# Patient Record
Sex: FEMALE | Race: WHITE | NOT HISPANIC OR LATINO | Employment: OTHER
[De-identification: names, ages, dates, MRNs, and addresses within clinical notes are randomized per-mention and may not be internally consistent; named-entity substitution may affect disease eponyms.]

---

## 2017-10-01 ENCOUNTER — HEALTH MAINTENANCE LETTER (OUTPATIENT)
Age: 62
End: 2017-10-01

## 2023-02-07 ENCOUNTER — APPOINTMENT (OUTPATIENT)
Dept: ULTRASOUND IMAGING | Facility: CLINIC | Age: 68
DRG: 871 | End: 2023-02-07
Attending: FAMILY MEDICINE
Payer: MEDICARE

## 2023-02-07 ENCOUNTER — APPOINTMENT (OUTPATIENT)
Dept: CT IMAGING | Facility: CLINIC | Age: 68
DRG: 871 | End: 2023-02-07
Attending: FAMILY MEDICINE
Payer: MEDICARE

## 2023-02-07 ENCOUNTER — APPOINTMENT (OUTPATIENT)
Dept: GENERAL RADIOLOGY | Facility: CLINIC | Age: 68
DRG: 871 | End: 2023-02-07
Attending: FAMILY MEDICINE
Payer: MEDICARE

## 2023-02-07 ENCOUNTER — HOSPITAL ENCOUNTER (INPATIENT)
Facility: CLINIC | Age: 68
LOS: 5 days | Discharge: HOME | DRG: 871 | End: 2023-02-12
Attending: FAMILY MEDICINE | Admitting: INTERNAL MEDICINE
Payer: MEDICARE

## 2023-02-07 DIAGNOSIS — R65.21 SEPTIC SHOCK (H): ICD-10-CM

## 2023-02-07 DIAGNOSIS — R65.20 SEPSIS WITH ENCEPHALOPATHY WITHOUT SEPTIC SHOCK, DUE TO UNSPECIFIED ORGANISM (H): ICD-10-CM

## 2023-02-07 DIAGNOSIS — R79.89 LFT ELEVATION: ICD-10-CM

## 2023-02-07 DIAGNOSIS — Z11.52 ENCOUNTER FOR SCREENING LABORATORY TESTING FOR SEVERE ACUTE RESPIRATORY SYNDROME CORONAVIRUS 2 (SARS-COV-2): ICD-10-CM

## 2023-02-07 DIAGNOSIS — A41.9 SEPSIS WITH ENCEPHALOPATHY WITHOUT SEPTIC SHOCK, DUE TO UNSPECIFIED ORGANISM (H): ICD-10-CM

## 2023-02-07 DIAGNOSIS — R78.81 E COLI BACTEREMIA: ICD-10-CM

## 2023-02-07 DIAGNOSIS — G93.41 SEPSIS WITH ENCEPHALOPATHY WITHOUT SEPTIC SHOCK, DUE TO UNSPECIFIED ORGANISM (H): ICD-10-CM

## 2023-02-07 DIAGNOSIS — N19 RENAL FAILURE, UNSPECIFIED CHRONICITY: ICD-10-CM

## 2023-02-07 DIAGNOSIS — E87.6 HYPOKALEMIA: ICD-10-CM

## 2023-02-07 DIAGNOSIS — R79.89 ELEVATED TROPONIN LEVEL NOT DUE TO ACUTE CORONARY SYNDROME: ICD-10-CM

## 2023-02-07 DIAGNOSIS — N39.0 E. COLI UTI: Primary | ICD-10-CM

## 2023-02-07 DIAGNOSIS — B96.20 E COLI BACTEREMIA: ICD-10-CM

## 2023-02-07 DIAGNOSIS — A41.9 SEPTIC SHOCK (H): ICD-10-CM

## 2023-02-07 DIAGNOSIS — N10 PYELONEPHRITIS, ACUTE: ICD-10-CM

## 2023-02-07 DIAGNOSIS — B96.20 E. COLI UTI: Primary | ICD-10-CM

## 2023-02-07 LAB
ALBUMIN SERPL BCG-MCNC: 3.5 G/DL (ref 3.5–5.2)
ALBUMIN UR-MCNC: 100 MG/DL
ALP SERPL-CCNC: 156 U/L (ref 35–104)
ALT SERPL W P-5'-P-CCNC: 88 U/L (ref 10–35)
ANION GAP SERPL CALCULATED.3IONS-SCNC: 18 MMOL/L (ref 7–15)
APPEARANCE UR: ABNORMAL
APTT PPP: 30 SECONDS (ref 22–38)
AST SERPL W P-5'-P-CCNC: 63 U/L (ref 10–35)
BACTERIA #/AREA URNS HPF: ABNORMAL /HPF
BASOPHILS # BLD AUTO: 0.1 10E3/UL (ref 0–0.2)
BASOPHILS NFR BLD AUTO: 0 %
BILIRUB SERPL-MCNC: 0.7 MG/DL
BILIRUB UR QL STRIP: NEGATIVE
BUN SERPL-MCNC: 21.8 MG/DL (ref 8–23)
CALCIUM SERPL-MCNC: 9.5 MG/DL (ref 8.8–10.2)
CHLORIDE SERPL-SCNC: 91 MMOL/L (ref 98–107)
COLOR UR AUTO: YELLOW
CREAT SERPL-MCNC: 1.33 MG/DL (ref 0.51–0.95)
CRP SERPL-MCNC: 393.3 MG/L
DEPRECATED HCO3 PLAS-SCNC: 26 MMOL/L (ref 22–29)
EOSINOPHIL # BLD AUTO: 0 10E3/UL (ref 0–0.7)
EOSINOPHIL NFR BLD AUTO: 0 %
ERYTHROCYTE [DISTWIDTH] IN BLOOD BY AUTOMATED COUNT: 13.1 % (ref 10–15)
ERYTHROCYTE [SEDIMENTATION RATE] IN BLOOD BY WESTERGREN METHOD: 41 MM/HR (ref 0–30)
FLUAV RNA SPEC QL NAA+PROBE: NEGATIVE
FLUBV RNA RESP QL NAA+PROBE: NEGATIVE
GFR SERPL CREATININE-BSD FRML MDRD: 43 ML/MIN/1.73M2
GLUCOSE BLDC GLUCOMTR-MCNC: 103 MG/DL (ref 70–99)
GLUCOSE SERPL-MCNC: 99 MG/DL (ref 70–99)
GLUCOSE UR STRIP-MCNC: NEGATIVE MG/DL
HCT VFR BLD AUTO: 36.9 % (ref 35–47)
HGB BLD-MCNC: 12.3 G/DL (ref 11.7–15.7)
HGB UR QL STRIP: ABNORMAL
HOLD SPECIMEN: NORMAL
IMM GRANULOCYTES # BLD: 0.6 10E3/UL
IMM GRANULOCYTES NFR BLD: 2 %
INR PPP: 1.09 (ref 0.85–1.15)
KETONES UR STRIP-MCNC: 15 MG/DL
LACTATE SERPL-SCNC: 1.3 MMOL/L (ref 0.7–2)
LEUKOCYTE ESTERASE UR QL STRIP: ABNORMAL
LYMPHOCYTES # BLD AUTO: 1.9 10E3/UL (ref 0.8–5.3)
LYMPHOCYTES NFR BLD AUTO: 7 %
MAGNESIUM SERPL-MCNC: 1.4 MG/DL (ref 1.7–2.3)
MAGNESIUM SERPL-MCNC: 1.7 MG/DL (ref 1.7–2.3)
MCH RBC QN AUTO: 30.4 PG (ref 26.5–33)
MCHC RBC AUTO-ENTMCNC: 33.3 G/DL (ref 31.5–36.5)
MCV RBC AUTO: 91 FL (ref 78–100)
MONOCYTES # BLD AUTO: 3.1 10E3/UL (ref 0–1.3)
MONOCYTES NFR BLD AUTO: 11 %
MUCOUS THREADS #/AREA URNS LPF: PRESENT /LPF
NEUTROPHILS # BLD AUTO: 22.3 10E3/UL (ref 1.6–8.3)
NEUTROPHILS NFR BLD AUTO: 80 %
NITRATE UR QL: POSITIVE
NRBC # BLD AUTO: 0 10E3/UL
NRBC BLD AUTO-RTO: 0 /100
PH UR STRIP: 6.5 [PH] (ref 5–7)
PLATELET # BLD AUTO: 208 10E3/UL (ref 150–450)
POTASSIUM SERPL-SCNC: 2.8 MMOL/L (ref 3.4–5.3)
POTASSIUM SERPL-SCNC: 2.9 MMOL/L (ref 3.4–5.3)
PROT SERPL-MCNC: 7.1 G/DL (ref 6.4–8.3)
RBC # BLD AUTO: 4.04 10E6/UL (ref 3.8–5.2)
RBC URINE: 12 /HPF
RSV RNA SPEC NAA+PROBE: NEGATIVE
SARS-COV-2 RNA RESP QL NAA+PROBE: NEGATIVE
SODIUM SERPL-SCNC: 135 MMOL/L (ref 136–145)
SP GR UR STRIP: 1.01 (ref 1–1.03)
SQUAMOUS EPITHELIAL: 3 /HPF
TROPONIN T SERPL HS-MCNC: 36 NG/L
TROPONIN T SERPL HS-MCNC: 44 NG/L
UROBILINOGEN UR STRIP-MCNC: NORMAL MG/DL
WBC # BLD AUTO: 28 10E3/UL (ref 4–11)
WBC CLUMPS #/AREA URNS HPF: PRESENT /HPF
WBC URINE: >182 /HPF

## 2023-02-07 PROCEDURE — 81001 URINALYSIS AUTO W/SCOPE: CPT | Performed by: FAMILY MEDICINE

## 2023-02-07 PROCEDURE — 96365 THER/PROPH/DIAG IV INF INIT: CPT | Mod: 59 | Performed by: FAMILY MEDICINE

## 2023-02-07 PROCEDURE — 71045 X-RAY EXAM CHEST 1 VIEW: CPT

## 2023-02-07 PROCEDURE — 258N000003 HC RX IP 258 OP 636: Performed by: FAMILY MEDICINE

## 2023-02-07 PROCEDURE — 87077 CULTURE AEROBIC IDENTIFY: CPT | Performed by: FAMILY MEDICINE

## 2023-02-07 PROCEDURE — 93005 ELECTROCARDIOGRAM TRACING: CPT | Performed by: FAMILY MEDICINE

## 2023-02-07 PROCEDURE — 85025 COMPLETE CBC W/AUTO DIFF WBC: CPT | Performed by: FAMILY MEDICINE

## 2023-02-07 PROCEDURE — G1010 CDSM STANSON: HCPCS

## 2023-02-07 PROCEDURE — 120N000001 HC R&B MED SURG/OB

## 2023-02-07 PROCEDURE — 250N000011 HC RX IP 250 OP 636: Performed by: FAMILY MEDICINE

## 2023-02-07 PROCEDURE — 84132 ASSAY OF SERUM POTASSIUM: CPT | Performed by: INTERNAL MEDICINE

## 2023-02-07 PROCEDURE — 85652 RBC SED RATE AUTOMATED: CPT | Performed by: FAMILY MEDICINE

## 2023-02-07 PROCEDURE — 250N000009 HC RX 250: Performed by: FAMILY MEDICINE

## 2023-02-07 PROCEDURE — 84132 ASSAY OF SERUM POTASSIUM: CPT | Performed by: FAMILY MEDICINE

## 2023-02-07 PROCEDURE — 87149 DNA/RNA DIRECT PROBE: CPT | Performed by: FAMILY MEDICINE

## 2023-02-07 PROCEDURE — 36415 COLL VENOUS BLD VENIPUNCTURE: CPT | Performed by: INTERNAL MEDICINE

## 2023-02-07 PROCEDURE — 36415 COLL VENOUS BLD VENIPUNCTURE: CPT | Performed by: FAMILY MEDICINE

## 2023-02-07 PROCEDURE — 250N000013 HC RX MED GY IP 250 OP 250 PS 637: Performed by: FAMILY MEDICINE

## 2023-02-07 PROCEDURE — 87637 SARSCOV2&INF A&B&RSV AMP PRB: CPT | Performed by: FAMILY MEDICINE

## 2023-02-07 PROCEDURE — 99285 EMERGENCY DEPT VISIT HI MDM: CPT | Mod: CS,25 | Performed by: FAMILY MEDICINE

## 2023-02-07 PROCEDURE — 83735 ASSAY OF MAGNESIUM: CPT | Performed by: FAMILY MEDICINE

## 2023-02-07 PROCEDURE — 85610 PROTHROMBIN TIME: CPT | Performed by: FAMILY MEDICINE

## 2023-02-07 PROCEDURE — 70498 CT ANGIOGRAPHY NECK: CPT | Mod: MG

## 2023-02-07 PROCEDURE — 80053 COMPREHEN METABOLIC PANEL: CPT | Performed by: FAMILY MEDICINE

## 2023-02-07 PROCEDURE — 96367 TX/PROPH/DG ADDL SEQ IV INF: CPT | Performed by: FAMILY MEDICINE

## 2023-02-07 PROCEDURE — 83605 ASSAY OF LACTIC ACID: CPT | Performed by: FAMILY MEDICINE

## 2023-02-07 PROCEDURE — 96361 HYDRATE IV INFUSION ADD-ON: CPT | Performed by: FAMILY MEDICINE

## 2023-02-07 PROCEDURE — 96375 TX/PRO/DX INJ NEW DRUG ADDON: CPT | Performed by: FAMILY MEDICINE

## 2023-02-07 PROCEDURE — C9803 HOPD COVID-19 SPEC COLLECT: HCPCS | Performed by: FAMILY MEDICINE

## 2023-02-07 PROCEDURE — 86140 C-REACTIVE PROTEIN: CPT | Performed by: FAMILY MEDICINE

## 2023-02-07 PROCEDURE — 258N000003 HC RX IP 258 OP 636: Performed by: INTERNAL MEDICINE

## 2023-02-07 PROCEDURE — 85730 THROMBOPLASTIN TIME PARTIAL: CPT | Performed by: FAMILY MEDICINE

## 2023-02-07 PROCEDURE — 76705 ECHO EXAM OF ABDOMEN: CPT

## 2023-02-07 PROCEDURE — 84484 ASSAY OF TROPONIN QUANT: CPT | Performed by: FAMILY MEDICINE

## 2023-02-07 PROCEDURE — 87186 SC STD MICRODIL/AGAR DIL: CPT | Performed by: FAMILY MEDICINE

## 2023-02-07 PROCEDURE — 99285 EMERGENCY DEPT VISIT HI MDM: CPT | Mod: CS | Performed by: FAMILY MEDICINE

## 2023-02-07 PROCEDURE — 83735 ASSAY OF MAGNESIUM: CPT | Performed by: INTERNAL MEDICINE

## 2023-02-07 PROCEDURE — 84484 ASSAY OF TROPONIN QUANT: CPT | Performed by: INTERNAL MEDICINE

## 2023-02-07 PROCEDURE — 82962 GLUCOSE BLOOD TEST: CPT

## 2023-02-07 PROCEDURE — 99223 1ST HOSP IP/OBS HIGH 75: CPT | Mod: AI | Performed by: INTERNAL MEDICINE

## 2023-02-07 PROCEDURE — 93010 ELECTROCARDIOGRAM REPORT: CPT | Performed by: FAMILY MEDICINE

## 2023-02-07 RX ORDER — LIDOCAINE 40 MG/G
CREAM TOPICAL
Status: DISCONTINUED | OUTPATIENT
Start: 2023-02-07 | End: 2023-02-12

## 2023-02-07 RX ORDER — IOPAMIDOL 755 MG/ML
500 INJECTION, SOLUTION INTRAVASCULAR ONCE
Status: COMPLETED | OUTPATIENT
Start: 2023-02-07 | End: 2023-02-07

## 2023-02-07 RX ORDER — SODIUM CHLORIDE, SODIUM LACTATE, POTASSIUM CHLORIDE, CALCIUM CHLORIDE 600; 310; 30; 20 MG/100ML; MG/100ML; MG/100ML; MG/100ML
INJECTION, SOLUTION INTRAVENOUS CONTINUOUS
Status: DISCONTINUED | OUTPATIENT
Start: 2023-02-07 | End: 2023-02-08

## 2023-02-07 RX ORDER — METRONIDAZOLE 500 MG/100ML
500 INJECTION, SOLUTION INTRAVENOUS ONCE
Status: COMPLETED | OUTPATIENT
Start: 2023-02-07 | End: 2023-02-07

## 2023-02-07 RX ORDER — LEVOTHYROXINE SODIUM 75 UG/1
75 TABLET ORAL DAILY
Status: DISCONTINUED | OUTPATIENT
Start: 2023-02-08 | End: 2023-02-12 | Stop reason: HOSPADM

## 2023-02-07 RX ORDER — LEVOTHYROXINE SODIUM 75 UG/1
75 TABLET ORAL DAILY
COMMUNITY
Start: 2022-11-30

## 2023-02-07 RX ORDER — ASPIRIN 325 MG
325 TABLET ORAL DAILY
COMMUNITY

## 2023-02-07 RX ORDER — NAPHAZOLINE HCL 0.012 %
2 DROPS OPHTHALMIC (EYE) EVERY 4 HOURS PRN
COMMUNITY

## 2023-02-07 RX ORDER — ENOXAPARIN SODIUM 100 MG/ML
40 INJECTION SUBCUTANEOUS EVERY 24 HOURS
Status: DISCONTINUED | OUTPATIENT
Start: 2023-02-08 | End: 2023-02-12 | Stop reason: HOSPADM

## 2023-02-07 RX ORDER — ATORVASTATIN CALCIUM 10 MG/1
20 TABLET, FILM COATED ORAL DAILY
Status: DISCONTINUED | OUTPATIENT
Start: 2023-02-08 | End: 2023-02-12 | Stop reason: HOSPADM

## 2023-02-07 RX ORDER — ACETAMINOPHEN 500 MG
1000 TABLET ORAL ONCE
Status: COMPLETED | OUTPATIENT
Start: 2023-02-07 | End: 2023-02-07

## 2023-02-07 RX ORDER — ONDANSETRON 2 MG/ML
4 INJECTION INTRAMUSCULAR; INTRAVENOUS EVERY 30 MIN PRN
Status: DISCONTINUED | OUTPATIENT
Start: 2023-02-07 | End: 2023-02-08

## 2023-02-07 RX ORDER — POTASSIUM CHLORIDE 1.5 G/1.58G
40 POWDER, FOR SOLUTION ORAL ONCE
Status: COMPLETED | OUTPATIENT
Start: 2023-02-07 | End: 2023-02-07

## 2023-02-07 RX ORDER — CEFTRIAXONE 1 G/1
1 INJECTION, POWDER, FOR SOLUTION INTRAMUSCULAR; INTRAVENOUS EVERY 24 HOURS
Status: DISCONTINUED | OUTPATIENT
Start: 2023-02-08 | End: 2023-02-08

## 2023-02-07 RX ORDER — ATORVASTATIN CALCIUM 20 MG/1
20 TABLET, FILM COATED ORAL DAILY
COMMUNITY
Start: 2023-01-20

## 2023-02-07 RX ADMIN — ONDANSETRON 4 MG: 2 INJECTION INTRAMUSCULAR; INTRAVENOUS at 19:05

## 2023-02-07 RX ADMIN — ACETAMINOPHEN 1000 MG: 500 TABLET ORAL at 19:32

## 2023-02-07 RX ADMIN — CEFEPIME HYDROCHLORIDE 2 G: 2 INJECTION, POWDER, FOR SOLUTION INTRAVENOUS at 19:19

## 2023-02-07 RX ADMIN — SODIUM CHLORIDE, POTASSIUM CHLORIDE, SODIUM LACTATE AND CALCIUM CHLORIDE: 600; 310; 30; 20 INJECTION, SOLUTION INTRAVENOUS at 23:31

## 2023-02-07 RX ADMIN — SODIUM CHLORIDE 100 ML: 9 INJECTION, SOLUTION INTRAVENOUS at 17:54

## 2023-02-07 RX ADMIN — SODIUM CHLORIDE, POTASSIUM CHLORIDE, SODIUM LACTATE AND CALCIUM CHLORIDE 500 ML: 600; 310; 30; 20 INJECTION, SOLUTION INTRAVENOUS at 23:49

## 2023-02-07 RX ADMIN — POTASSIUM CHLORIDE 40 MEQ: 1.5 POWDER, FOR SOLUTION ORAL at 19:06

## 2023-02-07 RX ADMIN — IOPAMIDOL 80 ML: 755 INJECTION, SOLUTION INTRAVENOUS at 17:55

## 2023-02-07 RX ADMIN — METRONIDAZOLE 500 MG: 500 INJECTION, SOLUTION INTRAVENOUS at 19:20

## 2023-02-07 RX ADMIN — SODIUM CHLORIDE 1000 ML: 9 INJECTION, SOLUTION INTRAVENOUS at 18:22

## 2023-02-07 ASSESSMENT — ACTIVITIES OF DAILY LIVING (ADL)
CONCENTRATING,_REMEMBERING_OR_MAKING_DECISIONS_DIFFICULTY: NO
WALKING_OR_CLIMBING_STAIRS_DIFFICULTY: NO
FALL_HISTORY_WITHIN_LAST_SIX_MONTHS: NO
DRESSING/BATHING_DIFFICULTY: NO
DOING_ERRANDS_INDEPENDENTLY_DIFFICULTY: NO
CHANGE_IN_FUNCTIONAL_STATUS_SINCE_ONSET_OF_CURRENT_ILLNESS/INJURY: NO
ADLS_ACUITY_SCORE: 35
DIFFICULTY_COMMUNICATING: NO
ADLS_ACUITY_SCORE: 35
WEAR_GLASSES_OR_BLIND: YES
ADLS_ACUITY_SCORE: 35
TOILETING_ISSUES: NO
HEARING_DIFFICULTY_OR_DEAF: NO
DIFFICULTY_EATING/SWALLOWING: NO

## 2023-02-07 NOTE — ED PROVIDER NOTES
"  History     Chief Complaint   Patient presents with     Nausea, Vomiting, & Diarrhea     Chills     Dizziness     Confusion     HPI  Isabell Bolden is a 67 year old female who presents to the ED this afternoon with nausea vomiting diarrhea word finding difficulties and confusion.  Accompanied by her daughter who contributes to the history.  She did not feel well yesterday and was nauseous and had emesis x1.  Developed diarrhea today.  Noted to be somewhat confused around 2:00 this afternoon by a neighbor and was having some difficulty finding words later this afternoon.  Denies any focal weakness but seem to be a bit slow to respond.  She felt \"drunk and dizzy\" yesterday.  No fevers but has had some chills and sweats.  Just has not been herself and for some reason had her cutting board on the coffee table today and she has no idea why she put that there.  Denies any headache.  No chest pain shortness of breath or abdominal pain.  Has not had anything to eat today and what she ate yesterday she vomited up.    Code stroke was called by nursing staff in triage.    Hysterectomy back in 2011 for endometrial cancer was caught early.  She had a few spots on her liver and biopsies were benign.        Allergies:  Allergies   Allergen Reactions     Ampicillin [Ampicillin Sodium]      Latex      Penicillin G Hives     Sulfa Drugs        Problem List:    Patient Active Problem List    Diagnosis Date Noted     Pyelonephritis, acute 02/07/2023     Priority: Medium     Elevated troponin level not due to acute coronary syndrome 02/07/2023     Priority: Medium     Sepsis with encephalopathy without septic shock, due to unspecified organism (H) 02/07/2023     Priority: Medium     Endometrial cancer (H) 04/02/2011     Priority: Medium        Past Medical History:    Past Medical History:   Diagnosis Date     Hypertension      Malignant neoplasm (H)        Past Surgical History:    Past Surgical History:   Procedure Laterality " Date     GYN SURGERY  2011    Uterine cancer       Family History:    Family History   Problem Relation Age of Onset     C.A.D. Father      Cancer Father      Hypertension Brother      Hypertension Sister        Social History:  Marital Status:   [5]  Social History     Tobacco Use     Smoking status: Former     Types: Cigarettes     Quit date: 2010     Years since quittin.1   Substance Use Topics     Alcohol use: Yes     Comment: Social Drinker     Drug use: No        Medications:    aspirin (ASA) 325 MG tablet  aspirin-acetaminophen-caffeine (EXCEDRIN EXTRA STRENGTH) 250-250-65 MG tablet  atorvastatin (LIPITOR) 20 MG tablet  hydrochlorothiazide (MICROZIDE) 12.5 MG capsule  levothyroxine (SYNTHROID/LEVOTHROID) 75 MCG tablet  sodium chloride (OCEAN) 0.65 % nasal spray          Review of Systems   All other systems reviewed and are negative.      Physical Exam   BP: 123/54  Pulse: 108  Temp: 98.4  F (36.9  C)  Resp: 16  Weight: 80.4 kg (177 lb 4.8 oz)  SpO2: 99 %      Physical Exam  Constitutional:       General: She is not in acute distress.     Appearance: Normal appearance.   HENT:      Mouth/Throat:      Mouth: Mucous membranes are moist.      Pharynx: Oropharynx is clear.   Eyes:      Extraocular Movements: Extraocular movements intact.      Pupils: Pupils are equal, round, and reactive to light.   Cardiovascular:      Rate and Rhythm: Normal rate and regular rhythm.   Pulmonary:      Effort: Pulmonary effort is normal.      Breath sounds: Normal breath sounds.   Abdominal:      Palpations: Abdomen is soft.      Tenderness: There is no abdominal tenderness.   Musculoskeletal:         General: Normal range of motion.      Right lower leg: No edema.      Left lower leg: No edema.   Skin:     General: Skin is warm and dry.      Findings: No rash.   Neurological:      Mental Status: She is alert and oriented to person, place, and time.      GCS: GCS eye subscore is 4. GCS verbal subscore is 5.  GCS motor subscore is 6.      Cranial Nerves: Cranial nerves 2-12 are intact.      Sensory: Sensation is intact.      Motor: Motor function is intact.      Coordination: Coordination is intact. Finger-Nose-Finger Test normal.      Gait: Gait is intact.         ED Course                 Procedures              EKG Interpretation:      Interpreted by Alin Jacobs MD  Time reviewed: 6:43 PM   Symptoms at time of EKG: weakness   Rhythm: normal sinus   Rate: 90  Axis: Normal  Ectopy: none  Conduction: normal  ST Segments/ T Waves: Non-specific ST-T wave changes  Q Waves: none  Comparison to prior: No old EKG available    Clinical Impression: Sinus rhythm at 91 bpm.  Nonspecific ST T/T wave changes are subtle.  No acute ischemic changes noted.      The patient has stroke symptoms:         ED Stroke specific documentation           NIHSS PDF     Patient last known well time: unknown, before 1400  ED Provider first to bedside at: 1739  CT Results received at: 1812    Thrombolytics:   Not given due to:   - stroke mimic: sepsis    If treating with thrombolytics: Ensure SBP<180 and DBP<105 prior to treatment with thrombolytics.  Administering thrombolytics after treatment with IV labetalol, hydralazine, or nicardipine is reasonable once BP control is established.    Endovascular Retrieval:  Not initiated due to absence of proximal vessel occlusion    National Institutes of Health Stroke Scale (Baseline)  Time Performed: 1740     Score    Level of consciousness: (0)   Alert, keenly responsive    LOC questions: (0)   Answers both questions correctly    LOC commands: (0)   Performs both tasks correctly    Best gaze: (0)   Normal    Visual: (0)   No visual loss    Facial palsy: (0)   Normal symmetrical movements    Motor arm (left): (0)   No drift    Motor arm (right): (0)   No drift    Motor leg (left): (0)   No drift    Motor leg (right): (0)   No drift    Limb ataxia: (0)   Absent    Sensory: (0)   Normal- no sensory  loss    Best language: (0)   Normal- no aphasia    Dysarthria: (0)   Normal    Extinction and inattention: (0)   No abnormality        Total Score:  0        Stroke Mimics were considered (including migraine headache, seizure disorder, hypoglycemia (or hyperglycemia), head or spinal trauma, CNS infection, Toxin ingestion and shock state (e.g. sepsis) .    Sxs deemed secondary to urosepsis, not a stroke.              Critical Care time:  was 90 minutes for this patient excluding procedures.     The patient has signs of Severe Sepsis        If one the following conditions is present, a 30 mL/kg bolus is recommended as part of the 6 hour bundle (IBW can be used for BMI >30, or document refusal/contraindication):      1.   Initial hypotension  defined as 2 bps < 90 or map < 65 in the 6hrs before or 3hrs after time zero.     2.  Lactate >4.      The patient has signs of Severe Sepsis as evidenced by:    1. 2 SIRS criteria, AND  2. Suspected infection, AND   3. Organ dysfunction: Acute encephalopathy due to sepsis (mild confusion)     Time severe sepsis diagnosis confirmed: 19:51  02/07/23 as this was the time when Met SIRS criteria and UA showed + infection    3 Hour Severe Sepsis Bundle Completion:    1. Initial Lactic Acid Result:   Recent Labs   Lab Test 02/07/23  1753   LACT 1.3     2. Blood Cultures before Antibiotics: Yes  3. Broad Spectrum Antibiotics Administered:  yes       Anti-infectives (From admission through now)    Start     Dose/Rate Route Frequency Ordered Stop    02/07/23 1840  ceFEPIme (MAXIPIME) 2 g vial to attach to  ml bag for ADULTS or 50 ml bag for PEDS         2 g  over 30 Minutes Intravenous ONCE 02/07/23 1839 02/07/23 2000 02/07/23 1840  metroNIDAZOLE (FLAGYL) infusion 500 mg        Note to Pharmacy: Metronidazole IV may be dosed more frequently than Q12h for bacteremia, CNS infection and Clostridium difficile.    500 mg  over 60 Minutes Intravenous ONCE 02/07/23 1839 02/07/23 2120           4. Is initial hypotension present?     No (IV fluid bolus NOT required). IV Fluid volume administered: 1000 ml to start.                    Severe Sepsis reassessment:  1. Repeat Lactic Acid Level within 6 hours of time zero: initial lactic acid was normal.                Results for orders placed or performed during the hospital encounter of 02/07/23 (from the past 24 hour(s))   Glucose by meter   Result Value Ref Range    GLUCOSE BY METER POCT 103 (H) 70 - 99 mg/dL   CT Head w/o Contrast    Narrative    EXAM: CT HEAD W/O CONTRAST, CTA HEAD NECK W CONTRAST  LOCATION: [facility]  DATE/TIME: 2/7/2023 5:52 PM    INDICATION: word finding difficulty, confusion  COMPARISON: None.  CONTRAST: 80mL, Isovue 370  TECHNIQUE: Head and neck CT angiogram with IV contrast. Noncontrast head CT followed by axial helical CT images of the head and neck vessels obtained during the arterial phase of intravenous contrast administration. Axial 2D reconstructed images and   multiplanar 3D MIP reconstructed images of the head and neck vessels were performed by the technologist. Dose reduction techniques were used. All stenosis measurements made according to NASCET criteria unless otherwise specified.    FINDINGS:   NONCONTRAST HEAD CT:   INTRACRANIAL CONTENTS: No intracranial hemorrhage, extraaxial collection, or mass effect.  No CT evidence of acute infarct. Normal parenchymal attenuation. Normal ventricles and sulci.     VISUALIZED ORBITS/SINUSES/MASTOIDS: No intraorbital abnormality. No paranasal sinus mucosal disease. No middle ear or mastoid effusion.    BONES/SOFT TISSUES: No acute abnormality.    HEAD CTA:  ANTERIOR CIRCULATION: No stenosis/occlusion, aneurysm, or high flow vascular malformation. Standard [place] anatomy.    POSTERIOR CIRCULATION: No stenosis/occlusion, aneurysm, or high flow vascular malformation. Balanced vertebral arteries supply a normal basilar artery.     DURAL  VENOUS SINUSES: Expected enhancement of the major dural venous sinuses.    NECK CTA:  RIGHT CAROTID: No measurable stenosis or dissection.    LEFT CAROTID: No measurable stenosis or dissection.    VERTEBRAL ARTERIES: No focal stenosis or dissection. Balanced vertebral arteries.    AORTIC ARCH: Classic aortic arch anatomy with no significant stenosis at the origin of the great vessels.    NONVASCULAR STRUCTURES: Unremarkable.       Impression    IMPRESSION:   HEAD CT:  1.  Normal head CT.    HEAD CTA:   1.  Normal CTA [place].    NECK CTA:  1.  Normal neck CTA.    Findings discussed with Dr. Jacobs at 12:00 PM on 02/07/2023   CBC with Platelets & Differential    Narrative    The following orders were created for panel order CBC with Platelets & Differential.  Procedure                               Abnormality         Status                     ---------                               -----------         ------                     CBC with platelets and d...[[id number]]  Abnormal            Final result                 Please view results for these tests on the individual orders.   INR   Result Value Ref Range    INR 1.09 0.85 - 1.15   Partial thromboplastin time   Result Value Ref Range    aPTT 30 22 - 38 Seconds   Troponin T, High Sensitivity   Result Value Ref Range    Troponin T, High Sensitivity 36 (H) <=14 ng/L   Comprehensive metabolic panel   Result Value Ref Range    Sodium 135 (L) 136 - 145 mmol/L    Potassium 2.9 (L) 3.4 - 5.3 mmol/L    Chloride 91 (L) 98 - 107 mmol/L    Carbon Dioxide (CO2) 26 22 - 29 mmol/L    Anion Gap 18 (H) 7 - 15 mmol/L    Urea Nitrogen 21.8 8.0 - 23.0 mg/dL    Creatinine 1.33 (H) 0.51 - 0.95 mg/dL    Calcium 9.5 8.8 - 10.2 mg/dL    Glucose 99 70 - 99 mg/dL    Alkaline Phosphatase 156 (H) 35 - 104 U/L    AST 63 (H) 10 - 35 U/L    ALT 88 (H) 10 - 35 U/L    Protein Total 7.1 6.4 - 8.3 g/dL    Albumin 3.5 3.5 - 5.2 g/dL    Bilirubin Total 0.7 <=1.2 mg/dL    GFR Estimate 43 (L)  >60 mL/min/1.73m2   Lactic acid whole blood   Result Value Ref Range    Lactic Acid 1.3 0.7 - 2.0 mmol/L   CBC with platelets and differential   Result Value Ref Range    WBC Count 28.0 (H) 4.0 - 11.0 10e3/uL    RBC Count 4.04 3.80 - 5.20 10e6/uL    Hemoglobin 12.3 11.7 - 15.7 g/dL    Hematocrit 36.9 35.0 - 47.0 %    MCV 91 78 - 100 fL    MCH 30.4 26.5 - 33.0 pg    MCHC 33.3 31.5 - 36.5 g/dL    RDW 13.1 10.0 - 15.0 %    Platelet Count 208 150 - 450 10e3/uL    % Neutrophils 80 %    % Lymphocytes 7 %    % Monocytes 11 %    % Eosinophils 0 %    % Basophils 0 %    % Immature Granulocytes 2 %    NRBCs per 100 WBC 0 <1 /100    Absolute Neutrophils 22.3 (H) 1.6 - 8.3 10e3/uL    Absolute Lymphocytes 1.9 0.8 - 5.3 10e3/uL    Absolute Monocytes 3.1 (H) 0.0 - 1.3 10e3/uL    Absolute Eosinophils 0.0 0.0 - 0.7 10e3/uL    Absolute Basophils 0.1 0.0 - 0.2 10e3/uL    Absolute Immature Granulocytes 0.6 (H) <=0.4 10e3/uL    Absolute NRBCs 0.0 10e3/uL   CRP inflammation   Result Value Ref Range    CRP Inflammation 393.30 (H) <5.00 mg/L   Erythrocyte sedimentation rate auto   Result Value Ref Range    Erythrocyte Sedimentation Rate 41 (H) 0 - 30 mm/hr   Magnesium   Result Value Ref Range    Magnesium 1.7 1.7 - 2.3 mg/dL   CTA Head Neck with Contrast    Narrative    EXAM: CT HEAD W/O CONTRAST, CTA HEAD NECK W CONTRAST  LOCATION: [facility]  DATE/TIME: 2/7/2023 5:52 PM    INDICATION: word finding difficulty, confusion  COMPARISON: None.  CONTRAST: 80mL, Isovue 370  TECHNIQUE: Head and neck CT angiogram with IV contrast. Noncontrast head CT followed by axial helical CT images of the head and neck vessels obtained during the arterial phase of intravenous contrast administration. Axial 2D reconstructed images and   multiplanar 3D MIP reconstructed images of the head and neck vessels were performed by the technologist. Dose reduction techniques were used. All stenosis measurements made according to NASCET  criteria unless otherwise specified.    FINDINGS:   NONCONTRAST HEAD CT:   INTRACRANIAL CONTENTS: No intracranial hemorrhage, extraaxial collection, or mass effect.  No CT evidence of acute infarct. Normal parenchymal attenuation. Normal ventricles and sulci.     VISUALIZED ORBITS/SINUSES/MASTOIDS: No intraorbital abnormality. No paranasal sinus mucosal disease. No middle ear or mastoid effusion.    BONES/SOFT TISSUES: No acute abnormality.    HEAD CTA:  ANTERIOR CIRCULATION: No stenosis/occlusion, aneurysm, or high flow vascular malformation. Standard [place] of [place] anatomy.    POSTERIOR CIRCULATION: No stenosis/occlusion, aneurysm, or high flow vascular malformation. Balanced vertebral arteries supply a normal basilar artery.     DURAL VENOUS SINUSES: Expected enhancement of the major dural venous sinuses.    NECK CTA:  RIGHT CAROTID: No measurable stenosis or dissection.    LEFT CAROTID: No measurable stenosis or dissection.    VERTEBRAL ARTERIES: No focal stenosis or dissection. Balanced vertebral arteries.    AORTIC ARCH: Classic aortic arch anatomy with no significant stenosis at the origin of the great vessels.    NONVASCULAR STRUCTURES: Unremarkable.       Impression    IMPRESSION:   HEAD CT:  1.  Normal head CT.    HEAD CTA:   1.  Normal CTA [place].    NECK CTA:  1.  Normal neck CTA.    Findings discussed with Dr. Jacobs at 12:00 PM on 02/07/2023   Symptomatic Influenza A/B & SARS-CoV2 (COVID-19) Virus PCR Multiplex Nose    Specimen: Nose; Swab   Result Value Ref Range    Influenza A PCR Negative Negative    Influenza B PCR Negative Negative    RSV PCR Negative Negative    SARS CoV2 PCR Negative Negative    Narrative    Testing was performed using the Xpert Xpress CoV2/Flu/RSV Assay on the [employer] GeneXpert Instrument. This test should be ordered for the detection of SARS-CoV-2 and influenza viruses in individuals who meet clinical and/or epidemiological criteria. Test performance is unknown in  asymptomatic patients. This test is for in vitro diagnostic use under the FDA EUA for laboratories certified under CLIA to perform high or moderate complexity testing. This test has not been FDA cleared or approved. A negative result does not rule out the presence of PCR inhibitors in the specimen or target RNA in concentration below the limit of detection for the assay. If only one viral target is positive but coinfection with multiple targets is suspected, the sample should be re-tested with another FDA cleared, approved, or authorized test, if coinfection would change clinical management. This test was validated by the [facility]. These laboratories are certified under the Clinical Laboratory Improvement Amendments of 1988 (CLIA-88) as qualified to perform high complexity laboratory testing.   [place] Draw    Narrative    The following orders were created for panel order [place] Draw.  Procedure                               Abnormality         Status                     ---------                               -----------         ------                     Extra Blood Culture Bottle[[id number]]                       Final result                 Please view results for these tests on the individual orders.   Extra Blood Culture Bottle   Result Value Ref Range    Hold Specimen JI    UA with Microscopic reflex to Culture    Specimen: Urine, NOS   Result Value Ref Range    Color Urine Yellow Colorless, Straw, Light Yellow, Yellow    Appearance Urine Cloudy (A) Clear    Glucose Urine Negative Negative mg/dL    Bilirubin Urine Negative Negative    Ketones Urine 15 (A) Negative mg/dL    Specific Gravity Urine 1.010 1.003 - 1.035    Blood Urine Large (A) Negative    pH Urine 6.5 5.0 - 7.0    Protein Albumin Urine 100 (A) Negative mg/dL    Urobilinogen Urine Normal Normal, 2.0 mg/dL    Nitrite Urine Positive (A) Negative    Leukocyte Esterase Urine Moderate (A) Negative    Bacteria Urine Moderate (A)  None Seen /HPF    WBC Clumps Urine Present (A) None Seen /HPF    Mucus Urine Present (A) None Seen /LPF    RBC Urine 12 (H) <=2 /HPF    WBC Urine >182 (H) <=5 /HPF    Squamous Epithelials Urine 3 (H) <=1 /HPF    Narrative    Urine Culture ordered based on laboratory criteria   XR Chest Port 1 View    Narrative    EXAM: XR CHEST PORT 1 VIEW  LOCATION: [facility]  DATE/TIME: 2/7/2023 8:11 PM    INDICATION: fever, weakness, borderline O2 sats  COMPARISON: None.      Impression    IMPRESSION: Negative chest. No focal infiltrates or consolidations. No pleural effusion or pneumothorax. Normal heart size and pulmonary vasculature. Old right lateral rib fracture.   Abdomen US, limited (RUQ only)    Narrative    EXAM: US ABDOMEN LIMITED  LOCATION: [facility]  DATE/TIME: 2/7/2023 8:11 PM    INDICATION: elevated WBC and LFT, concern for GB disease.  COMPARISON: CT 04/12/2011, MRI 03/22/2011  TECHNIQUE: Limited abdominal ultrasound.    FINDINGS:    GALLBLADDER: Normal. No gallstones, wall thickening, or pericholecystic fluid. Negative sonographic Howard's sign.    BILE DUCTS: No biliary dilatation. The common duct measures 6 mm.    LIVER: Decreased parenchymal echogenicity with increased conspicuity of portal triads. Echogenic lesion in the posterior right hepatic lobe measuring up to 3.1 cm which likely corresponds to benign hepatic hemangioma seen on prior MRI, no specific   follow-up recommended.    RIGHT KIDNEY: No hydronephrosis.    PANCREAS: The visualized portions are normal.    No ascites.      Impression    IMPRESSION:  1.  Findings suggestive of hepatitis.  2.  No cholelithiasis or anuradha biliary obstruction.       Troponin T, High Sensitivity (now)   Result Value Ref Range    Troponin T, High Sensitivity 44 (H) <=14 ng/L       Medications   ondansetron (ZOFRAN) injection 4 mg (4 mg Intravenous Given 2/7/23 1905)   0.9% sodium chloride BOLUS (0 mLs  Intravenous Stopped 2/7/23 1942)   iopamidol (ISOVUE-370) solution 500 mL (80 mLs Intravenous Given 2/7/23 1755)   sodium chloride 0.9 % bag 100mL for CT scan flush use (100 mLs Intravenous Given 2/7/23 1754)   ceFEPIme (MAXIPIME) 2 g vial to attach to  ml bag for ADULTS or 50 ml bag for PEDS (0 g Intravenous Stopped 2/7/23 2000)   metroNIDAZOLE (FLAGYL) infusion 500 mg (0 mg Intravenous Stopped 2/7/23 2128)   potassium chloride (KLOR-CON) Packet 40 mEq (40 mEq Oral Given 2/7/23 1906)   acetaminophen (TYLENOL) tablet 1,000 mg (1,000 mg Oral Given 2/7/23 1932)       Assessments & Plan (with Medical Decision Making)  67-year-old female in today with confusion and some word finding difficulty.  Confusion noted around 2 PM, about 4 hours ago by a neighbor and some word finding difficulty later this afternoon but she has been sick since yesterday.  Had nausea and vomiting x1 which has improved but did have some diarrhea today.  Has not had anything to eat since yesterday and vomited that up.  Really no intake today.  No documented fevers but has felt chilled and sweats.  Code stroke was called into triage.  Suspect her symptoms are related to an infection rather than true stroke but we will go ahead and get a head CT and CTA for completeness sake.  I spoke with stroke neurology and they are in agreement and will follow-up on her scan results. In the meantime, will start some IV fluids and send infectious labs as well.  White count Elevated related 28.0 but her lactic is normal.  Head CT and CTA were unremarkable.  We de-escalated the code stroke.  AST and ALT are up slightly.  She still has her gallbladder.  Get a right upper quadrant ultrasound to look for any evidence of cholecystitis.  She is on medication for her blood pressure and cholesterol.  Avoided having to go on diabetes medications when she lost weight.    She denies any chest pain shortness of breath or cough.  Still has not given a urine sample.   Looking for other sources of infection in the meantime we will start her on IV antibiotics until we get this sorted out.  Blood cultures were drawn prior to antibiotic administration.  Potassium is low when we will replace orally if able, and add magnesium to her labs.  High-sensitivity troponin surprisingly came back elevated at 36.  We will trend this and add sed rate and CRP.  CRP and sed rate are elevated.  Magnesium normal at 1.7.  UA quite significant with greater than 182 WBCs, moderate leukocyte esterase and positive nitrites.  Urine culture and blood cultures are pending.  Her second troponin has gone up to 44.  Call was placed to cardiology to discuss.  9:34 PM: I spoke with Dr. Cecil Dobson from cardiology.  He had already reviewed the patient's chart, EKG and labs and feels that this small troponin leak is almost certainly due to her infectious process and not due to acute coronary syndrome.  He feels she can be treated here safely and does not need any further cardiac work-up.  We can certainly get an echo in the morning for completeness sake but she does not need to be transferred.  10:03 PM I spoke with Dr. Hoff, hospitalist on call.  She has assumed her care and will write orders.         I have reviewed the nursing notes.    I have reviewed the findings, diagnosis, plan and need for follow up with the patient.        Critical Care Addendum    My initial assessment, based on my review of nursing observations, review of vital signs, focused history, physical exam, 12 lead ECG analysis and discussion with daughter, established that Isabell Bolden has altered mental status, suspicion for sepsis and need for evaluation and early goal-directed therapy and and concern for stroke, which requires immediate intervention, and therefore she is critically ill.     After the initial assessment, the care team initiated multiple lab tests, initiated IV fluid administration, initiated medication therapy with IV  antibiotics and consulted with stroke neurology and cardiology to provide stabilization care. Due to the critical nature of this patient, I reassessed nursing observations, vital signs, physical exam, 12 lead ECG analysis, interpretation of labs, mental status and neurologic status multiple times prior to her disposition.     Time also spent performing documentation, discussion with family to obtain medical information for decision making, reviewing test results, discussion with consultants and coordination of care.     Critical care time (excluding teaching time and procedures): 90 minutes.     ED to Inpatient Handoff:    Discussed with Dr Hoff at 2203  Patient accepted for Inpatient Stay  Pending studies include UC, BC  Code Status: Not Addressed                 Medical Decision Making  The patient's presentation is strongly suggestive of an acute health issue posing potential threat to life or bodily function.    The patient's evaluation involved:  an assessment requiring an independent historian (daughter)  ordering and/or review of 3+ test(s) in this encounter (see separate area of note for details)  discussion of management or test interpretation with another health professional (see separate area of note for details)    The patient's management involved prescription drug management (including medications given in the ED) and a decision regarding hospitalization.        New Prescriptions    No medications on file       Final diagnoses:   Sepsis with encephalopathy without septic shock, due to unspecified organism (H)   Pyelonephritis, acute - left on exam   Elevated troponin level not due to acute coronary syndrome - likely due to sepsis- discussed with cardiology   Hypokalemia - 2.9   Renal failure, unspecified chronicity - unclear whether this is acute or chronic   LFT elevation - mild,        2/7/2023   [facility] EMERGENCY DEPT     Alin Jacobs MD  02/07/23 2203       Mehdi  Erp, Alin Montes MD  02/07/23 2055

## 2023-02-07 NOTE — ED TRIAGE NOTES
Pt presents with vomiting and diarrhea. Pt felt drunk dizzy yesterday. Pt having bouts of confusion. Daughter noted at 1400 pt was having trouble finding words. Pt had put cutting board on a table and she didn't remember doing this.  Pt alert and orientated in triage room Generalized weakness. Stroke evaluation called.    Triage Assessment       Row Name 02/07/23 5804       Triage Assessment (Adult)    Airway WDL WDL       Respiratory WDL    Respiratory WDL WDL       Skin Circulation/Temperature WDL    Skin Circulation/Temperature WDL WDL       Cardiac WDL    Cardiac WDL WDL       Peripheral/Neurovascular WDL    Peripheral Neurovascular WDL WDL       Cognitive/Neuro/Behavioral WDL    Cognitive/Neuro/Behavioral WDL WDL

## 2023-02-08 ENCOUNTER — APPOINTMENT (OUTPATIENT)
Dept: GENERAL RADIOLOGY | Facility: CLINIC | Age: 68
DRG: 871 | End: 2023-02-08
Attending: INTERNAL MEDICINE
Payer: MEDICARE

## 2023-02-08 PROBLEM — Z85.42 HISTORY OF ENDOMETRIAL CANCER: Status: ACTIVE | Noted: 2023-02-08

## 2023-02-08 PROBLEM — E87.6 HYPOPOTASSEMIA: Status: ACTIVE | Noted: 2023-02-08

## 2023-02-08 PROBLEM — G93.40 ENCEPHALOPATHY: Status: ACTIVE | Noted: 2023-02-08

## 2023-02-08 PROBLEM — I10 ESSENTIAL HYPERTENSION: Status: ACTIVE | Noted: 2023-02-08

## 2023-02-08 PROBLEM — N17.9 ACUTE KIDNEY INJURY (H): Status: ACTIVE | Noted: 2023-02-08

## 2023-02-08 PROBLEM — R82.81 PYURIA: Status: ACTIVE | Noted: 2023-02-07

## 2023-02-08 PROBLEM — E83.42 HYPOMAGNESEMIA: Status: ACTIVE | Noted: 2023-02-08

## 2023-02-08 PROBLEM — E03.9 ACQUIRED HYPOTHYROIDISM: Status: ACTIVE | Noted: 2023-02-08

## 2023-02-08 PROBLEM — R78.81 GRAM-NEGATIVE BACTEREMIA: Status: ACTIVE | Noted: 2023-02-08

## 2023-02-08 PROBLEM — E88.09 HYPOALBUMINEMIA: Status: ACTIVE | Noted: 2023-02-08

## 2023-02-08 PROBLEM — D64.9 ANEMIA, UNSPECIFIED TYPE: Status: ACTIVE | Noted: 2023-02-08

## 2023-02-08 PROBLEM — R19.7 DIARRHEA: Status: ACTIVE | Noted: 2023-02-08

## 2023-02-08 PROBLEM — R65.21 SEPTIC SHOCK (H): Status: ACTIVE | Noted: 2023-02-07

## 2023-02-08 PROBLEM — I24.89 DEMAND ISCHEMIA (H): Status: ACTIVE | Noted: 2023-02-07

## 2023-02-08 PROBLEM — R79.89 ABNORMAL LFTS: Status: ACTIVE | Noted: 2023-02-08

## 2023-02-08 LAB
ACINETOBACTER SPECIES: NOT DETECTED
ALBUMIN SERPL BCG-MCNC: 2.7 G/DL (ref 3.5–5.2)
ALP SERPL-CCNC: 135 U/L (ref 35–104)
ALT SERPL W P-5'-P-CCNC: 66 U/L (ref 10–35)
ANION GAP SERPL CALCULATED.3IONS-SCNC: 10 MMOL/L (ref 7–15)
AST SERPL W P-5'-P-CCNC: 54 U/L (ref 10–35)
BASE EXCESS BLDV CALC-SCNC: 4.1 MMOL/L (ref -7.7–1.9)
BILIRUB SERPL-MCNC: 0.5 MG/DL
BUN SERPL-MCNC: 22.1 MG/DL (ref 8–23)
C COLI+JEJUNI+LARI FUSA STL QL NAA+PROBE: NOT DETECTED
CALCIUM SERPL-MCNC: 8.7 MG/DL (ref 8.8–10.2)
CHLORIDE SERPL-SCNC: 98 MMOL/L (ref 98–107)
CITROBACTER SPECIES: NOT DETECTED
CREAT SERPL-MCNC: 1.24 MG/DL (ref 0.51–0.95)
CTX-M: NOT DETECTED
DEPRECATED HCO3 PLAS-SCNC: 26 MMOL/L (ref 22–29)
EC STX1 GENE STL QL NAA+PROBE: NOT DETECTED
EC STX2 GENE STL QL NAA+PROBE: NOT DETECTED
ENTEROBACTER SPECIES: NOT DETECTED
ERYTHROCYTE [DISTWIDTH] IN BLOOD BY AUTOMATED COUNT: 13.5 % (ref 10–15)
ESCHERICHIA COLI: DETECTED
GFR SERPL CREATININE-BSD FRML MDRD: 47 ML/MIN/1.73M2
GLUCOSE BLDC GLUCOMTR-MCNC: 113 MG/DL (ref 70–99)
GLUCOSE BLDC GLUCOMTR-MCNC: 193 MG/DL (ref 70–99)
GLUCOSE SERPL-MCNC: 123 MG/DL (ref 70–99)
HCO3 BLDV-SCNC: 29 MMOL/L (ref 21–28)
HCT VFR BLD AUTO: 31.6 % (ref 35–47)
HGB BLD-MCNC: 10.3 G/DL (ref 11.7–15.7)
HGB BLD-MCNC: 9.6 G/DL (ref 11.7–15.7)
IMP: NOT DETECTED
KLEBSIELLA OXYTOCA: NOT DETECTED
KLEBSIELLA PNEUMONIAE: NOT DETECTED
KPC: NOT DETECTED
LACTATE SERPL-SCNC: 1.1 MMOL/L (ref 0.7–2)
MAGNESIUM SERPL-MCNC: 3 MG/DL (ref 1.7–2.3)
MCH RBC QN AUTO: 30.2 PG (ref 26.5–33)
MCHC RBC AUTO-ENTMCNC: 32.6 G/DL (ref 31.5–36.5)
MCV RBC AUTO: 93 FL (ref 78–100)
NDM: NOT DETECTED
NOROV GI+II ORF1-ORF2 JNC STL QL NAA+PR: NOT DETECTED
O2/TOTAL GAS SETTING VFR VENT: 28 %
OXA (DETECTED/NOT DETECTED): NOT DETECTED
PCO2 BLDV: 45 MM HG (ref 40–50)
PH BLDV: 7.42 [PH] (ref 7.32–7.43)
PLATELET # BLD AUTO: 162 10E3/UL (ref 150–450)
PO2 BLDV: 47 MM HG (ref 25–47)
POTASSIUM SERPL-SCNC: 2.8 MMOL/L (ref 3.4–5.3)
POTASSIUM SERPL-SCNC: 4.1 MMOL/L (ref 3.4–5.3)
POTASSIUM SERPL-SCNC: 4.1 MMOL/L (ref 3.4–5.3)
PROT SERPL-MCNC: 5.8 G/DL (ref 6.4–8.3)
PROTEUS SPECIES: NOT DETECTED
PSEUDOMONAS AERUGINOSA: NOT DETECTED
RBC # BLD AUTO: 3.41 10E6/UL (ref 3.8–5.2)
RVA NSP5 STL QL NAA+PROBE: NOT DETECTED
SALMONELLA SP RPOD STL QL NAA+PROBE: NOT DETECTED
SHIGELLA SP+EIEC IPAH STL QL NAA+PROBE: NOT DETECTED
SODIUM SERPL-SCNC: 134 MMOL/L (ref 136–145)
TROPONIN T SERPL HS-MCNC: 39 NG/L
TROPONIN T SERPL HS-MCNC: 55 NG/L
V CHOL+PARA RFBL+TRKH+TNAA STL QL NAA+PR: NOT DETECTED
VIM: NOT DETECTED
WBC # BLD AUTO: 26.2 10E3/UL (ref 4–11)
Y ENTERO RECN STL QL NAA+PROBE: NOT DETECTED

## 2023-02-08 PROCEDURE — 36569 INSJ PICC 5 YR+ W/O IMAGING: CPT

## 2023-02-08 PROCEDURE — 84484 ASSAY OF TROPONIN QUANT: CPT | Performed by: INTERNAL MEDICINE

## 2023-02-08 PROCEDURE — 250N000013 HC RX MED GY IP 250 OP 250 PS 637: Performed by: INTERNAL MEDICINE

## 2023-02-08 PROCEDURE — 87506 IADNA-DNA/RNA PROBE TQ 6-11: CPT | Performed by: INTERNAL MEDICINE

## 2023-02-08 PROCEDURE — 36415 COLL VENOUS BLD VENIPUNCTURE: CPT | Performed by: INTERNAL MEDICINE

## 2023-02-08 PROCEDURE — 200N000001 HC R&B ICU

## 2023-02-08 PROCEDURE — 3E043XZ INTRODUCTION OF VASOPRESSOR INTO CENTRAL VEIN, PERCUTANEOUS APPROACH: ICD-10-PCS | Performed by: PEDIATRICS

## 2023-02-08 PROCEDURE — 84132 ASSAY OF SERUM POTASSIUM: CPT | Performed by: INTERNAL MEDICINE

## 2023-02-08 PROCEDURE — 85014 HEMATOCRIT: CPT | Performed by: INTERNAL MEDICINE

## 2023-02-08 PROCEDURE — 258N000003 HC RX IP 258 OP 636: Performed by: PEDIATRICS

## 2023-02-08 PROCEDURE — 83605 ASSAY OF LACTIC ACID: CPT | Performed by: PEDIATRICS

## 2023-02-08 PROCEDURE — 258N000003 HC RX IP 258 OP 636: Performed by: INTERNAL MEDICINE

## 2023-02-08 PROCEDURE — 250N000013 HC RX MED GY IP 250 OP 250 PS 637: Performed by: PEDIATRICS

## 2023-02-08 PROCEDURE — 87040 BLOOD CULTURE FOR BACTERIA: CPT | Performed by: PEDIATRICS

## 2023-02-08 PROCEDURE — 36415 COLL VENOUS BLD VENIPUNCTURE: CPT | Performed by: PEDIATRICS

## 2023-02-08 PROCEDURE — 272N000580 HC KIT, 4 OR 5FR DUAL LUMEN POWER PICC

## 2023-02-08 PROCEDURE — 99291 CRITICAL CARE FIRST HOUR: CPT | Performed by: PEDIATRICS

## 2023-02-08 PROCEDURE — 82803 BLOOD GASES ANY COMBINATION: CPT | Performed by: PEDIATRICS

## 2023-02-08 PROCEDURE — 250N000009 HC RX 250: Performed by: PEDIATRICS

## 2023-02-08 PROCEDURE — 250N000011 HC RX IP 250 OP 636: Performed by: INTERNAL MEDICINE

## 2023-02-08 PROCEDURE — 250N000009 HC RX 250: Performed by: INTERNAL MEDICINE

## 2023-02-08 PROCEDURE — 250N000011 HC RX IP 250 OP 636: Performed by: PEDIATRICS

## 2023-02-08 PROCEDURE — 83735 ASSAY OF MAGNESIUM: CPT | Performed by: INTERNAL MEDICINE

## 2023-02-08 PROCEDURE — 999N000065 XR CHEST 1 VIEW

## 2023-02-08 PROCEDURE — 85018 HEMOGLOBIN: CPT | Performed by: PEDIATRICS

## 2023-02-08 RX ORDER — LIDOCAINE 40 MG/G
CREAM TOPICAL
Status: ACTIVE | OUTPATIENT
Start: 2023-02-08 | End: 2023-02-11

## 2023-02-08 RX ORDER — SODIUM CHLORIDE, SODIUM LACTATE, POTASSIUM CHLORIDE, CALCIUM CHLORIDE 600; 310; 30; 20 MG/100ML; MG/100ML; MG/100ML; MG/100ML
INJECTION, SOLUTION INTRAVENOUS CONTINUOUS
Status: DISCONTINUED | OUTPATIENT
Start: 2023-02-08 | End: 2023-02-10

## 2023-02-08 RX ORDER — NOREPINEPHRINE BITARTRATE 0.02 MG/ML
.01-.6 INJECTION, SOLUTION INTRAVENOUS CONTINUOUS
Status: DISCONTINUED | OUTPATIENT
Start: 2023-02-08 | End: 2023-02-08

## 2023-02-08 RX ORDER — POTASSIUM CHLORIDE 1.5 G/1.58G
20 POWDER, FOR SOLUTION ORAL ONCE
Status: COMPLETED | OUTPATIENT
Start: 2023-02-08 | End: 2023-02-08

## 2023-02-08 RX ORDER — ROPIVACAINE IN 0.9% SOD CHL/PF 0.1 %
.03-.125 PLASTIC BAG, INJECTION (ML) EPIDURAL CONTINUOUS
Status: DISCONTINUED | OUTPATIENT
Start: 2023-02-08 | End: 2023-02-09

## 2023-02-08 RX ORDER — ONDANSETRON 2 MG/ML
4 INJECTION INTRAMUSCULAR; INTRAVENOUS EVERY 6 HOURS PRN
Status: DISCONTINUED | OUTPATIENT
Start: 2023-02-08 | End: 2023-02-12 | Stop reason: HOSPADM

## 2023-02-08 RX ORDER — MAGNESIUM SULFATE HEPTAHYDRATE 40 MG/ML
4 INJECTION, SOLUTION INTRAVENOUS ONCE
Status: COMPLETED | OUTPATIENT
Start: 2023-02-08 | End: 2023-02-08

## 2023-02-08 RX ORDER — ACETAMINOPHEN 325 MG/1
975 TABLET ORAL EVERY 6 HOURS PRN
Status: DISCONTINUED | OUTPATIENT
Start: 2023-02-08 | End: 2023-02-12 | Stop reason: HOSPADM

## 2023-02-08 RX ORDER — ONDANSETRON 4 MG/1
4 TABLET, ORALLY DISINTEGRATING ORAL EVERY 6 HOURS PRN
Status: DISCONTINUED | OUTPATIENT
Start: 2023-02-08 | End: 2023-02-12 | Stop reason: HOSPADM

## 2023-02-08 RX ORDER — LORAZEPAM 0.5 MG/1
0.5 TABLET ORAL
Status: DISCONTINUED | OUTPATIENT
Start: 2023-02-08 | End: 2023-02-08

## 2023-02-08 RX ORDER — CEFTRIAXONE 2 G/1
2 INJECTION, POWDER, FOR SOLUTION INTRAMUSCULAR; INTRAVENOUS EVERY 24 HOURS
Status: DISCONTINUED | OUTPATIENT
Start: 2023-02-09 | End: 2023-02-11

## 2023-02-08 RX ORDER — NICOTINE POLACRILEX 4 MG
15-30 LOZENGE BUCCAL
Status: DISCONTINUED | OUTPATIENT
Start: 2023-02-08 | End: 2023-02-12 | Stop reason: HOSPADM

## 2023-02-08 RX ORDER — POTASSIUM CHLORIDE 1.5 G/1.58G
40 POWDER, FOR SOLUTION ORAL ONCE
Status: COMPLETED | OUTPATIENT
Start: 2023-02-08 | End: 2023-02-08

## 2023-02-08 RX ORDER — DEXTROSE MONOHYDRATE 25 G/50ML
25-50 INJECTION, SOLUTION INTRAVENOUS
Status: DISCONTINUED | OUTPATIENT
Start: 2023-02-08 | End: 2023-02-12 | Stop reason: HOSPADM

## 2023-02-08 RX ADMIN — ONDANSETRON 4 MG: 2 INJECTION INTRAMUSCULAR; INTRAVENOUS at 16:06

## 2023-02-08 RX ADMIN — ASPIRIN 325 MG: 325 TABLET ORAL at 08:22

## 2023-02-08 RX ADMIN — SODIUM CHLORIDE, POTASSIUM CHLORIDE, SODIUM LACTATE AND CALCIUM CHLORIDE 1000 ML: 600; 310; 30; 20 INJECTION, SOLUTION INTRAVENOUS at 01:23

## 2023-02-08 RX ADMIN — CEFTRIAXONE SODIUM 1 G: 1 INJECTION, POWDER, FOR SOLUTION INTRAMUSCULAR; INTRAVENOUS at 06:46

## 2023-02-08 RX ADMIN — LEVOTHYROXINE SODIUM 75 MCG: 75 TABLET ORAL at 08:22

## 2023-02-08 RX ADMIN — SODIUM CHLORIDE, POTASSIUM CHLORIDE, SODIUM LACTATE AND CALCIUM CHLORIDE 1000 ML: 600; 310; 30; 20 INJECTION, SOLUTION INTRAVENOUS at 09:13

## 2023-02-08 RX ADMIN — SODIUM CHLORIDE, POTASSIUM CHLORIDE, SODIUM LACTATE AND CALCIUM CHLORIDE 500 ML: 600; 310; 30; 20 INJECTION, SOLUTION INTRAVENOUS at 03:03

## 2023-02-08 RX ADMIN — ACETAMINOPHEN 975 MG: 325 TABLET ORAL at 18:03

## 2023-02-08 RX ADMIN — Medication 0.03 MCG/KG/MIN: at 20:46

## 2023-02-08 RX ADMIN — ENOXAPARIN SODIUM 40 MG: 40 INJECTION SUBCUTANEOUS at 10:49

## 2023-02-08 RX ADMIN — POTASSIUM CHLORIDE FOR ORAL SOLUTION 20 MEQ: 1.5 POWDER, FOR SOLUTION ORAL at 02:12

## 2023-02-08 RX ADMIN — SODIUM CHLORIDE, POTASSIUM CHLORIDE, SODIUM LACTATE AND CALCIUM CHLORIDE: 600; 310; 30; 20 INJECTION, SOLUTION INTRAVENOUS at 14:22

## 2023-02-08 RX ADMIN — ACETAMINOPHEN 975 MG: 325 TABLET ORAL at 10:49

## 2023-02-08 RX ADMIN — SODIUM CHLORIDE, POTASSIUM CHLORIDE, SODIUM LACTATE AND CALCIUM CHLORIDE: 600; 310; 30; 20 INJECTION, SOLUTION INTRAVENOUS at 06:50

## 2023-02-08 RX ADMIN — MAGNESIUM SULFATE HEPTAHYDRATE 4 G: 40 INJECTION, SOLUTION INTRAVENOUS at 00:47

## 2023-02-08 RX ADMIN — POTASSIUM CHLORIDE FOR ORAL SOLUTION 40 MEQ: 1.5 POWDER, FOR SOLUTION ORAL at 00:46

## 2023-02-08 RX ADMIN — LIDOCAINE HYDROCHLORIDE 3 ML: 10 INJECTION, SOLUTION EPIDURAL; INFILTRATION; INTRACAUDAL; PERINEURAL at 22:31

## 2023-02-08 RX ADMIN — ATORVASTATIN CALCIUM 20 MG: 10 TABLET, FILM COATED ORAL at 08:22

## 2023-02-08 ASSESSMENT — ACTIVITIES OF DAILY LIVING (ADL)
ADLS_ACUITY_SCORE: 24
ADLS_ACUITY_SCORE: 20
ADLS_ACUITY_SCORE: 24
ADLS_ACUITY_SCORE: 20
ADLS_ACUITY_SCORE: 24
ADLS_ACUITY_SCORE: 20

## 2023-02-08 NOTE — ED NOTES
"Patient reports feeling a lot better. Requesting PO fluids, able to tolerate them well. Reports that she is now feeling \"warm\", no longer needing room temp to be up or several warm blankets. Family member in room stating she will be leaving for the night, can return in the AM once patient is admitted.  "

## 2023-02-08 NOTE — PLAN OF CARE
Goal Outcome Evaluation:      Plan of Care Reviewed With: patient    Overall Patient Progress: improvingOverall Patient Progress: improving    Outcome Evaluation: A&Ox4. pt was having difficulty finding her words and experiencing confusion at beginning of admission, this has cleared up. Ambulates to bathroom SBA. LR running @ 100. BPs soft overnight, LR bolus given x3 for a total of 2L. Having loose stools and dark cloudy urine. On tele. Trops elevated, cardiology consulted in ED and placed on high sensitivity draws, continue to monitor. Code stroke initiated in ED and pt was cleared.

## 2023-02-08 NOTE — ED NOTES
Placed patient on O2 d/t readings <88%. Increased heat in room as patient shivering. Oral temp 97.8F.States she has had these shivers for the last 3 days and they last several hours. Provider advised, Tylenol ordered and administered. Warm blankets provided. IV abx administered per MAR. Assisted patient to bathroom and obtained UA.

## 2023-02-08 NOTE — H&P
"[facility]    History and Physical - Hospitalist Service       Date of Admission:  2/7/2023    Assessment & Plan    Urosepsis  -ceftriaxone 1g IV daily  -blood and urine cultures pending  -LR 500cc bolus x1 now for soft BP  -lactic acid normal    Toxic-metabolic encephalopathy  -seems much improved since arrival  -CT head and CTA head/neck normal; Stroke Neuro recommends MRI with and without contrast to r/o CVA  -if negative MRI, no additional stroke workup    ?gastroenteritis  -enteric panel ordered  -prn zofran    Hypokalemia and hypomagnesemia  -suspect due to GI losses; pt also on hydrochlorothiazide, will hold for now  -replacement protocols ordered    ?TERA  -unknown baseline but pt denies h/o CKD  -repeat BMP in AM after IVF; suspect prerenal etiology    Elevated transaminases  -pt denies known h/o liver disease (although ultrasound findings consistent with hepatitis) ; baseline labs unavailable  -may be due to viral process in setting of gastroenteritis  -repeat CMP in AM    Elevated trop  -no chest pain, no acute EKG changes  -low suspicion for ACS; cont to trend    Hypothyroidism  -cont levothyroxine    HLD  -cont statin    HTN  -hold hydrochlorothiazide for now in setting of low BP and hypokalemia     Diet: Combination Diet Regular Diet Adult    DVT Prophylaxis: lovenox  Dueñas Catheter: Not present  Lines: None     Code Status:  DNR-DNI    Clinically Significant Risk Factors Present on Admission        # Hypokalemia: Lowest K = 2.8 mmol/L in last 2 days, will replace as needed     # Hypomagnesemia: Lowest Mg = 1.4 mg/dL in last 2 days, will replace as needed            # Overweight: Estimated body mass index is 29.9 kg/m  as calculated from the following:    Height as of this encounter: 1.651 m (5' 5\").    Weight as of this encounter: 81.5 kg (179 lb 11.2 oz).           Disposition Plan      Expected Discharge Date: 02/09/2023                The patient's care was discussed " with the patient.        QUE ROA MD  [facility]  Securely message with the [employer] Web Console (learn more here)  Text page via [facility] Paging/Directory      Visit/Communication Style   Virtual (Video) communication was used to evaluate Isabell.  Isabell consented to the use of video communication: yes  Video START time: 2340 , 2/7/2023  Video STOP time: 2355, 2/7/2023   Patient's location: [facility]   Provider's location during the visit: [facility] Tele-medicine site        ______________________________________________________________________    Chief Complaint   AMS    History of Present Illness    67yoF with hypothyroidism, HTN, h/o endometrial CA (2011), and HLD presented to the ED with three days of chills, sweating, vomiting, diarrhea, poor po intake and weakness.  She felt so weak that she felt uncoordinated in her movements.  Per family report it seemed she had some word-finding difficulties and was confused.  Code stroke was called in the ED.  Initial imaging is negative.      She is feeling better now after IVF.  She denies fever, URI symptoms, burning with urination, increased urinary frequency, focal weakness, chest pain.    Review of Systems    General: negative for fever  Eyes: negative for blurred vision, loss of vision  Ear Nose and Throat: negative for pharyngitis, speech or swallowing difficulties  Respiratory:  negative for sputum production, wheezing, SHRESTHA, pleuritic pain, sob or cough  Cardiology:  negative for chest pain, palpitations, orthopnea, PND, edema, syncope   Gastrointestinal: negative for abdominal pain, constipation, hematemesis, melena or hematochezia  Genitourinary: negative for frequency, urgency, dysuria, hematuria   Neurological: negative for focal weakness, paresthesia    Past Medical History    I have reviewed this patient's medical history and updated it with pertinent information if needed.   Past  Medical History:   Diagnosis Date     Hypertension     started on medication in 2010     Malignant neoplasm (H)     Diagnosed 2011   hypothyroidism    Past Surgical History   I have reviewed this patient's surgical history and updated it with pertinent information if needed.  Past Surgical History:   Procedure Laterality Date     GYN SURGERY  2011    Uterine cancer       Social History   I have reviewed this patient's social history and updated it with pertinent information if needed.  Social History     Tobacco Use     Smoking status: Former     Types: Cigarettes     Quit date: 2010     Years since quittin.1   Substance Use Topics     Alcohol use: Yes     Comment: Social Drinker     Drug use: No       Family History   I have reviewed this patient's family history and updated it with pertinent information if needed.  Family History   Problem Relation Age of Onset     C.A.D. Father      Cancer Father      Hypertension Brother      Hypertension Sister        Prior to Admission Medications   Prior to Admission Medications   Prescriptions Last Dose Informant Patient Reported? Taking?   aspirin (ASA) 325 MG tablet Past Week Self Yes Yes   Sig: Take 325 mg by mouth daily   aspirin-acetaminophen-caffeine (EXCEDRIN EXTRA STRENGTH) 250-250-65 MG tablet Past Week Self Yes Yes   Sig: Take 2 tablets by mouth every 4 hours as needed   atorvastatin (LIPITOR) 20 MG tablet Past Week Self Yes Yes   Sig: Take 20 mg by mouth daily   hydrochlorothiazide (MICROZIDE) 12.5 MG capsule Past Week Self Yes Yes   Sig: Take 1 capsule by mouth daily.   levothyroxine (SYNTHROID/LEVOTHROID) 75 MCG tablet Past Week Self Yes Yes   Sig: Take 75 mcg by mouth daily   sodium chloride (OCEAN) 0.65 % nasal spray Past Week Self Yes Yes   Sig: Spray 1 spray in nostril daily as needed.      Facility-Administered Medications: None     Allergies   Allergies   Allergen Reactions     Ampicillin [Ampicillin Sodium]      Latex       Penicillin G Hives     Sulfa Drugs        Physical Exam   Vital Signs: Temp: 97.8  F (36.6  C) Temp src: Oral BP: (!) 85/41 Pulse: 99   Resp: 18 SpO2: 96 % O2 Device: Nasal cannula Oxygen Delivery: 2 LPM  Weight: 179 lbs 11.2 oz    Gen:  Well-developed, well-nourished, in no acute distress, lying semi-supine in hospital stretcher  HEENT:  Anicteric sclera, PER, hearing intact to voice  Resp:  No accessory muscle use, breath sounds clear; no wheezes no rales no rhonchi  Card:  No murmur, normal S1, S2   Abd:  Soft per RN exam, no TTP, non-distended, normoactive bowel sounds are present  Musc:  Normal strength and movement of the major muscle groups without obvious deformity  Psych:  Good insight, oriented to person, place and time, not anxious, not agitated    Data     Recent Labs   Lab 02/07/23  2254 02/07/23  1753 02/07/23  1742   WBC  --  28.0*  --    HGB  --  12.3  --    MCV  --  91  --    PLT  --  208  --    INR  --  1.09  --    NA  --  135*  --    POTASSIUM 2.8* 2.9*  --    CHLORIDE  --  91*  --    CO2  --  26  --    BUN  --  21.8  --    CR  --  1.33*  --    ANIONGAP  --  18*  --    FADY  --  9.5  --    GLC  --  99 103*   ALBUMIN  --  3.5  --    PROTTOTAL  --  7.1  --    BILITOTAL  --  0.7  --    ALKPHOS  --  156*  --    ALT  --  88*  --    AST  --  63*  --          Recent Results (from the past 24 hour(s))   CT Head w/o Contrast    Narrative    EXAM: CT HEAD W/O CONTRAST, CTA HEAD NECK W CONTRAST  LOCATION: [facility]  DATE/TIME: 2/7/2023 5:52 PM    INDICATION: word finding difficulty, confusion  COMPARISON: None.  CONTRAST: 80mL, Isovue 370  TECHNIQUE: Head and neck CT angiogram with IV contrast. Noncontrast head CT followed by axial helical CT images of the head and neck vessels obtained during the arterial phase of intravenous contrast administration. Axial 2D reconstructed images and   multiplanar 3D MIP reconstructed images of the head and neck vessels were performed by the  technologist. Dose reduction techniques were used. All stenosis measurements made according to NASCET criteria unless otherwise specified.    FINDINGS:   NONCONTRAST HEAD CT:   INTRACRANIAL CONTENTS: No intracranial hemorrhage, extraaxial collection, or mass effect.  No CT evidence of acute infarct. Normal parenchymal attenuation. Normal ventricles and sulci.     VISUALIZED ORBITS/SINUSES/MASTOIDS: No intraorbital abnormality. No paranasal sinus mucosal disease. No middle ear or mastoid effusion.    BONES/SOFT TISSUES: No acute abnormality.    HEAD CTA:  ANTERIOR CIRCULATION: No stenosis/occlusion, aneurysm, or high flow vascular malformation. Standard [place] anatomy.    POSTERIOR CIRCULATION: No stenosis/occlusion, aneurysm, or high flow vascular malformation. Balanced vertebral arteries supply a normal basilar artery.     DURAL VENOUS SINUSES: Expected enhancement of the major dural venous sinuses.    NECK CTA:  RIGHT CAROTID: No measurable stenosis or dissection.    LEFT CAROTID: No measurable stenosis or dissection.    VERTEBRAL ARTERIES: No focal stenosis or dissection. Balanced vertebral arteries.    AORTIC ARCH: Classic aortic arch anatomy with no significant stenosis at the origin of the great vessels.    NONVASCULAR STRUCTURES: Unremarkable.       Impression    IMPRESSION:   HEAD CT:  1.  Normal head CT.    HEAD CTA:   1.  Normal CTA [place].    NECK CTA:  1.  Normal neck CTA.    Findings discussed with Dr. Jacobs at 12:00 PM on 02/07/2023   CTA Head Neck with Contrast    Narrative    EXAM: CT HEAD W/O CONTRAST, CTA HEAD NECK W CONTRAST  LOCATION: [facility]  DATE/TIME: 2/7/2023 5:52 PM    INDICATION: word finding difficulty, confusion  COMPARISON: None.  CONTRAST: 80mL, Isovue 370  TECHNIQUE: Head and neck CT angiogram with IV contrast. Noncontrast head CT followed by axial helical CT images of the head and neck vessels obtained during the arterial phase  of intravenous contrast administration. Axial 2D reconstructed images and   multiplanar 3D MIP reconstructed images of the head and neck vessels were performed by the technologist. Dose reduction techniques were used. All stenosis measurements made according to NASCET criteria unless otherwise specified.    FINDINGS:   NONCONTRAST HEAD CT:   INTRACRANIAL CONTENTS: No intracranial hemorrhage, extraaxial collection, or mass effect.  No CT evidence of acute infarct. Normal parenchymal attenuation. Normal ventricles and sulci.     VISUALIZED ORBITS/SINUSES/MASTOIDS: No intraorbital abnormality. No paranasal sinus mucosal disease. No middle ear or mastoid effusion.    BONES/SOFT TISSUES: No acute abnormality.    HEAD CTA:  ANTERIOR CIRCULATION: No stenosis/occlusion, aneurysm, or high flow vascular malformation. Standard [place] anatomy.    POSTERIOR CIRCULATION: No stenosis/occlusion, aneurysm, or high flow vascular malformation. Balanced vertebral arteries supply a normal basilar artery.     DURAL VENOUS SINUSES: Expected enhancement of the major dural venous sinuses.    NECK CTA:  RIGHT CAROTID: No measurable stenosis or dissection.    LEFT CAROTID: No measurable stenosis or dissection.    VERTEBRAL ARTERIES: No focal stenosis or dissection. Balanced vertebral arteries.    AORTIC ARCH: Classic aortic arch anatomy with no significant stenosis at the origin of the great vessels.    NONVASCULAR STRUCTURES: Unremarkable.       Impression    IMPRESSION:   HEAD CT:  1.  Normal head CT.    HEAD CTA:   1.  Normal CTA [place].    NECK CTA:  1.  Normal neck CTA.    Findings discussed with Dr. Jacobs at 12:00 PM on 02/07/2023   XR Chest Port 1 View    Narrative    EXAM: XR CHEST PORT 1 VIEW  LOCATION: [facility]  DATE/TIME: 2/7/2023 8:11 PM    INDICATION: fever, weakness, borderline O2 sats  COMPARISON: None.      Impression    IMPRESSION: Negative chest. No focal infiltrates or  consolidations. No pleural effusion or pneumothorax. Normal heart size and pulmonary vasculature. Old right lateral rib fracture.   Abdomen US, limited (RUQ only)    Narrative    EXAM: US ABDOMEN LIMITED  LOCATION: [facility]  DATE/TIME: 2/7/2023 8:11 PM    INDICATION: elevated WBC and LFT, concern for GB disease.  COMPARISON: CT 04/12/2011, MRI 03/22/2011  TECHNIQUE: Limited abdominal ultrasound.    FINDINGS:    GALLBLADDER: Normal. No gallstones, wall thickening, or pericholecystic fluid. Negative sonographic Howard's sign.    BILE DUCTS: No biliary dilatation. The common duct measures 6 mm.    LIVER: Decreased parenchymal echogenicity with increased conspicuity of portal triads. Echogenic lesion in the posterior right hepatic lobe measuring up to 3.1 cm which likely corresponds to benign hepatic hemangioma seen on prior MRI, no specific   follow-up recommended.    RIGHT KIDNEY: No hydronephrosis.    PANCREAS: The visualized portions are normal.    No ascites.      Impression    IMPRESSION:  1.  Findings suggestive of hepatitis.  2.  No cholelithiasis or anuradha biliary obstruction.

## 2023-02-08 NOTE — CONSULTS
[facility]    Stroke Telephone Note    I was called by Alin Jacobs on 02/07/23 regarding patient Isabell Bolden. The patient is a 67 year old female who had diarrhea and nausea/ vomiting yesterday but word finding and confusion was prevalent at 2pm. They symptoms of confusion has resolved. She never had and focal weakness associated with this episode.   LKN: 2pm.    Stroke Code Data (for stroke code without tele)  Stroke code activated 02/07/23   1755   Stroke provider first response  02/07/23   1758            Last known normal 02/07/23   1400        Time of discovery   (or onset of symptoms) 02/07/23   1400   Head CT read by Stroke Neuro Dr/Provider 02/07/23   1802   Was stroke code de-escalated? Yes 02/07/23 1811          Imaging Findings   Per personal review: Head CT/CTA showed no acute hemorrhage, no acute stroke and no large vessel occlusion.     Official Radiology read is pending if any acute findings on official report please page back for further management.       Intravenous Thrombolysis  Not given due to:   - unclear or unfavorable risk-benefit profile for extended window thrombolysis beyond the conventional 4.5 hour time window    Endovascular Treatment  Not initiated due to absence of proximal vessel occlusion    Impression  # Episode of confusion and word finding difficulty- resolved, has active infectious symptoms with nausea, diarrhea, vomiting that started yesterday. Symptoms are more likely from an acute infection with an encephalopathy than a stroke. Given the onset at 2pm with infectious symptoms starting yesterday she is not a good TNK candidate. She currently has normal speech. Risk of TNK outweighs the benefit.     Recommendations     MRI brain WWO  If negative for acute stroke the no further stroke work-up needed    My recommendations are based on the information provided over the phone by Isabell Bolden's in-person providers. They are not  "intended to replace the clinical judgment of her in-person providers. I was not requested to personally see or examine the patient at this time.    The Stroke Staff is Dr. Solorzano  .    Cesilia Sullivan MD  Vascular Neurology Fellow    To page me or covering stroke neurology team member, click here: [facility]COM  Choose \"On Call\" tab at top, then select \"NEUROLOGY/ALL SITES\" from middle drop-down box, press Enter, then look for \"stroke\" or \"telestroke\" for your site.        "

## 2023-02-08 NOTE — ED NOTES
ED Nursing criteria listed below was addressed during verbal handoff:     Abnormal vitals: Yes  Abnormal results: Yes  Med Reconciliation completed: Yes  Meds given in ED: Yes  Any Overdue Meds: No  Core Measures: Yes  Isolation: No  Special needs: Yes  Skin assessment: Yes    Observation Patient  Education provided: N/A

## 2023-02-08 NOTE — PROGRESS NOTES
[facility]    Medicine Progress Note - Hospitalist Service    Date of Admission:  2/7/2023    Assessment & Plan   67-year-old woman with hypertension, hypothyroidism, and remote history of endometrial cancer presented with confusion and was admitted last night due to concerns for urosepsis, encephalopathy, gastroenteritis, and electrolyte disturbances.  Signs of sepsis have worsened with persistent hypotension despite adequate fluid resuscitation, and blood cultures are growing gram-negative rods on preliminary results.  This is concerning for potentially life-threatening septic shock due to gram-negative bacteremia.  Urinary source remains possible.  She continues to have diarrheal stool which can cause hypovolemia that will exacerbate septic shock.  Signs of encephalopathy have improved and she does not have lingering focal neurologic deficits lowering clinical suspicion for acute cerebral infarct.  Signs of acute kidney injury persist.  Electrolyte disturbances have improved.  Elevated troponin was probably due to demand ischemia from septic shock.  She has become anemic overnight coinciding with fluid resuscitation.  Active bleeding is not evident and lab results argue against hemolysis.  Hemodilution is possible.    Principal Problem:    Septic shock (H)  Active Problems:    Gram-negative bacteremia    Pyuria    Demand ischemia (H)    Diarrhea    Anemia, unspecified type    Acute kidney injury (H)    Encephalopathy    Hypopotassemia    Abnormal LFTs    Hypomagnesemia    History of endometrial cancer    Hypoalbuminemia    Essential hypertension    Acquired hypothyroidism    Transferred to ICU  Continue empiric IV Rocephin but increase dose to 2 g every 24 hours, adjust antibiotic therapy based on test results  Testing stool for enteric pathogens  Continue IV fluids with lactated Ringer's and increased rate to 150 mL/h  Start vasopressors with Levophed infusion titrated to keep  mean arterial pressure greater than 65, anticipate PICC line if Levophed is started  Monitor neurologic status including mentation closely  Postpone brain MRI at this time until she is more stable hemodynamically, patient reporting claustrophobia so anticipate she will need lorazepam or similar sedative prior to MRI which should be withheld until she is stable hemodynamically  Follow electrolytes and continue potassium and magnesium supplementation as indicated, add phosphorus supplementation if indicated  Monitor urine output and renal function closely  Monitor liver function  No need to continue to follow troponin levels unless her cardiac status deteriorates  Hold chronic hydrochlorothiazide  Continue chronic levothyroxine       Diet: Combination Diet Regular Diet Adult    DVT Prophylaxis: Enoxaparin (Lovenox) SQ  Dueñas Catheter: Not present  Lines: None     Cardiac Monitoring: ACTIVE order. Indication: Electrolyte Imbalance (24 hours)- Magnesium <1.3 mg/ml; Potassium < =2.8 or > 5.5 mg/ml  Code Status: No CPR- Do NOT Intubate      Clinically Significant Risk Factors Present on Admission                         Disposition Plan      Expected Discharge Date: 02/09/2023                  Blade Guevara MD  Hospitalist Service  [facility]  Securely message with Vocera (more info)  Text page via [facility] Paging/Directory   ______________________________________________________________________    Interval History   She says she is starting to feel somewhat better today.  She can think more clearly and is no longer confused or having difficulty finding words.  She says she could not even text yesterday when she was confused.  She says her friends had noticed that she was confused and therefore had her seek medical attention.  She continues to have fairly large volume watery diarrhea but is no longer nauseated.  She has tolerated some limited oral intake.  She denies any abdominal pain.   "Dizziness has resolved.  She has not had fever although had temperature as low as 96.6.  Tachycardia last night has resolved and blood pressure has improved compared with overnight, but she has been persistently hypotensive with mean arterial pressure consistently less than 65 today despite boluses of IV fluids and ongoing continuous IV fluid infusion.  She has had adequate urine output overnight.    Physical Exam   Vital Signs: Temp: 97.5  F (36.4  C) Temp src: Axillary BP: 92/46 Pulse: 80   Resp: 22 SpO2: 91 % O2 Device: None (Room air)    Patient Vitals for the past 24 hrs:   BP Temp Temp src Pulse Resp SpO2 Height Weight   02/08/23 1317 92/46 -- -- 80 -- 91 % -- --   02/08/23 1249 96/77 -- -- -- -- 90 % -- --   02/08/23 1215 91/52 -- -- 80 -- 90 % -- --   02/08/23 1130 93/49 -- -- 80 -- 98 % -- --   02/08/23 1019 99/49 97.5  F (36.4  C) Axillary 80 -- -- -- --   02/08/23 0913 95/51 -- -- 83 -- -- -- --   02/08/23 0753 96/50 (!) 96.6  F (35.9  C) Oral 78 22 91 % -- --   02/08/23 0405 98/55 -- -- 80 18 -- -- --   02/08/23 0241 (!) 86/46 -- -- 87 18 94 % -- --   02/08/23 0050 (!) 74/41 98.2  F (36.8  C) Oral 93 18 -- -- --   02/07/23 2306 (!) 85/41 97.8  F (36.6  C) Oral 99 18 96 % 1.651 m (5' 5\") 81.5 kg (179 lb 11.2 oz)   02/07/23 2230 95/54 -- -- 101 10 -- -- --   02/07/23 2215 91/55 -- -- 105 18 96 % -- --   02/07/23 2145 -- -- -- 102 23 95 % -- --   02/07/23 2000 117/50 -- -- 112 20 94 % -- --   02/07/23 1945 -- 97.8  F (36.6  C) Oral 118 (!) 36 100 % -- --   02/07/23 1900 105/81 -- -- 98 19 90 % -- --   02/07/23 1830 111/60 -- -- 84 20 96 % -- --   02/07/23 1815 113/60 -- -- 87 25 95 % -- --   02/07/23 1745 130/60 -- -- -- -- 95 % -- --   02/07/23 1739 -- -- -- -- -- -- -- 80.4 kg (177 lb 4.8 oz)   02/07/23 1735 123/54 98.4  F (36.9  C) Oral 108 16 99 % -- --     Weight: 179 lbs 11.2 oz   Wt Readings from Last 4 Encounters:   02/07/23 81.5 kg (179 lb 11.2 oz)   03/30/11 97.3 kg (214 lb 8 oz) "       Intake/Output Summary (Last 24 hours) at 2/8/2023 1321  Last data filed at 2/8/2023 1251  Gross per 24 hour   Intake 2341 ml   Output 900 ml   Net 1441 ml     General Appearance: Ill-appearing pale elderly woman, no signs of acute distress while resting in bed  Respiratory: Normal respiratory effort, clear lungs  Cardiovascular: Regular rate and rhythm, weak but palpable radial pulse, normal capillary refill in the fingertips, no peripheral edema, no JVD  GI: Hypoactive bowel sounds, nondistended abdomen, soft, no abdominal tenderness  Skin: Pale color, no rash seen  Neuro: Alert and maintains wakefulness and attention, no confusion evident, answers questions appropriately, follows simple instructions, normal speech, able to move all limbs purposefully and symmetrically    Medical Decision Making     Total critical care time 53 minutes so far today including time spent transferring the patient to ICU to start vasopressor therapy for treatment of potentially life-threatening septic shock      Data     I have personally reviewed the following data over the past 24 hrs:    26.2 (H)  \   9.6 (L)   / 162     134 (L) 98 22.1 /  123 (H)   4.1; 4.1 26 1.24 (H) \       ALT: 66 (H) AST: 54 (H) AP: 135 (H) TBILI: 0.5   ALB: 2.7 (L) TOT PROTEIN: 5.8 (L) LIPASE: N/A       Trop: 39 (H) BNP: N/A       Procal: N/A CRP: 393.30 (H) Lactic Acid: 1.1       INR:  1.09 PTT:  30   D-dimer:  N/A Fibrinogen:  N/A        Urine culture is pending  Both blood cultures obtained in the ER are growing gram-negative rods on preliminary results  Stool testing for enteric pathogen's is pending  Troponin peak was 55 and has since trended downward    Venous Blood Gas  Recent Labs   Lab 02/08/23  1158   PHV 7.42   PCO2V 45   PO2V 47   HCO3V 29*   YEHUDA 4.1*   O2PER 28     Imaging results reviewed over the past 24 hrs:     Recent Results (from the past 24 hour(s))   CT Head w/o Contrast    Narrative    EXAM: CT HEAD W/O CONTRAST, CTA HEAD NECK W  CONTRAST  LOCATION: [facility]  DATE/TIME: 2/7/2023 5:52 PM    INDICATION: word finding difficulty, confusion  COMPARISON: None.  CONTRAST: 80mL, Isovue 370  TECHNIQUE: Head and neck CT angiogram with IV contrast. Noncontrast head CT followed by axial helical CT images of the head and neck vessels obtained during the arterial phase of intravenous contrast administration. Axial 2D reconstructed images and   multiplanar 3D MIP reconstructed images of the head and neck vessels were performed by the technologist. Dose reduction techniques were used. All stenosis measurements made according to NASCET criteria unless otherwise specified.    FINDINGS:   NONCONTRAST HEAD CT:   INTRACRANIAL CONTENTS: No intracranial hemorrhage, extraaxial collection, or mass effect.  No CT evidence of acute infarct. Normal parenchymal attenuation. Normal ventricles and sulci.     VISUALIZED ORBITS/SINUSES/MASTOIDS: No intraorbital abnormality. No paranasal sinus mucosal disease. No middle ear or mastoid effusion.    BONES/SOFT TISSUES: No acute abnormality.    HEAD CTA:  ANTERIOR CIRCULATION: No stenosis/occlusion, aneurysm, or high flow vascular malformation. Standard [place] anatomy.    POSTERIOR CIRCULATION: No stenosis/occlusion, aneurysm, or high flow vascular malformation. Balanced vertebral arteries supply a normal basilar artery.     DURAL VENOUS SINUSES: Expected enhancement of the major dural venous sinuses.    NECK CTA:  RIGHT CAROTID: No measurable stenosis or dissection.    LEFT CAROTID: No measurable stenosis or dissection.    VERTEBRAL ARTERIES: No focal stenosis or dissection. Balanced vertebral arteries.    AORTIC ARCH: Classic aortic arch anatomy with no significant stenosis at the origin of the great vessels.    NONVASCULAR STRUCTURES: Unremarkable.       Impression    IMPRESSION:   HEAD CT:  1.  Normal head CT.    HEAD CTA:   1.  Normal CTA [place].    NECK CTA:  1.   Normal neck CTA.    Findings discussed with Dr. Jacobs at 12:00 PM on 02/07/2023   CTA Head Neck with Contrast    Narrative    EXAM: CT HEAD W/O CONTRAST, CTA HEAD NECK W CONTRAST  LOCATION: [facility]  DATE/TIME: 2/7/2023 5:52 PM    INDICATION: word finding difficulty, confusion  COMPARISON: None.  CONTRAST: 80mL, Isovue 370  TECHNIQUE: Head and neck CT angiogram with IV contrast. Noncontrast head CT followed by axial helical CT images of the head and neck vessels obtained during the arterial phase of intravenous contrast administration. Axial 2D reconstructed images and   multiplanar 3D MIP reconstructed images of the head and neck vessels were performed by the technologist. Dose reduction techniques were used. All stenosis measurements made according to NASCET criteria unless otherwise specified.    FINDINGS:   NONCONTRAST HEAD CT:   INTRACRANIAL CONTENTS: No intracranial hemorrhage, extraaxial collection, or mass effect.  No CT evidence of acute infarct. Normal parenchymal attenuation. Normal ventricles and sulci.     VISUALIZED ORBITS/SINUSES/MASTOIDS: No intraorbital abnormality. No paranasal sinus mucosal disease. No middle ear or mastoid effusion.    BONES/SOFT TISSUES: No acute abnormality.    HEAD CTA:  ANTERIOR CIRCULATION: No stenosis/occlusion, aneurysm, or high flow vascular malformation. Standard [place] anatomy.    POSTERIOR CIRCULATION: No stenosis/occlusion, aneurysm, or high flow vascular malformation. Balanced vertebral arteries supply a normal basilar artery.     DURAL VENOUS SINUSES: Expected enhancement of the major dural venous sinuses.    NECK CTA:  RIGHT CAROTID: No measurable stenosis or dissection.    LEFT CAROTID: No measurable stenosis or dissection.    VERTEBRAL ARTERIES: No focal stenosis or dissection. Balanced vertebral arteries.    AORTIC ARCH: Classic aortic arch anatomy with no significant stenosis at the origin of the great  vessels.    NONVASCULAR STRUCTURES: Unremarkable.       Impression    IMPRESSION:   HEAD CT:  1.  Normal head CT.    HEAD CTA:   1.  Normal CTA [place].    NECK CTA:  1.  Normal neck CTA.    Findings discussed with Dr. Jacobs at 12:00 PM on 02/07/2023   XR Chest Port 1 View    Narrative    EXAM: XR CHEST PORT 1 VIEW  LOCATION: [facility]  DATE/TIME: 2/7/2023 8:11 PM    INDICATION: fever, weakness, borderline O2 sats  COMPARISON: None.      Impression    IMPRESSION: Negative chest. No focal infiltrates or consolidations. No pleural effusion or pneumothorax. Normal heart size and pulmonary vasculature. Old right lateral rib fracture.   Abdomen US, limited (RUQ only)    Narrative    EXAM: US ABDOMEN LIMITED  LOCATION: [facility]  DATE/TIME: 2/7/2023 8:11 PM    INDICATION: elevated WBC and LFT, concern for GB disease.  COMPARISON: CT 04/12/2011, MRI 03/22/2011  TECHNIQUE: Limited abdominal ultrasound.    FINDINGS:    GALLBLADDER: Normal. No gallstones, wall thickening, or pericholecystic fluid. Negative sonographic Howard's sign.    BILE DUCTS: No biliary dilatation. The common duct measures 6 mm.    LIVER: Decreased parenchymal echogenicity with increased conspicuity of portal triads. Echogenic lesion in the posterior right hepatic lobe measuring up to 3.1 cm which likely corresponds to benign hepatic hemangioma seen on prior MRI, no specific   follow-up recommended.    RIGHT KIDNEY: No hydronephrosis.    PANCREAS: The visualized portions are normal.    No ascites.      Impression    IMPRESSION:  1.  Findings suggestive of hepatitis.  2.  No cholelithiasis or anuradha biliary obstruction.         Recent Labs   Lab 02/08/23  1158 02/08/23  0537 02/08/23  0532 02/07/23  2359 02/07/23  2254 02/07/23  1753 02/07/23  1753 02/07/23  1742   WBC  --  26.2*  --   --   --   --  28.0*  --    HGB 9.6* 10.3*  --   --   --   --  12.3  --    MCV  --  93  --   --    --   --  91  --    PLT  --  162  --   --   --   --  208  --    INR  --   --   --   --   --   --  1.09  --    NA  --   --  134*  --   --   --  135*  --    POTASSIUM  --   --  4.1  4.1 2.8* 2.8*   < > 2.9*  --    CHLORIDE  --   --  98  --   --   --  91*  --    CO2  --   --  26  --   --   --  26  --    BUN  --   --  22.1  --   --   --  21.8  --    CR  --   --  1.24*  --   --   --  1.33*  --    ANIONGAP  --   --  10  --   --   --  18*  --    FADY  --   --  8.7*  --   --   --  9.5  --    GLC  --   --  123*  --   --   --  99 103*   ALBUMIN  --   --  2.7*  --   --   --  3.5  --    PROTTOTAL  --   --  5.8*  --   --   --  7.1  --    BILITOTAL  --   --  0.5  --   --   --  0.7  --    ALKPHOS  --   --  135*  --   --   --  156*  --    ALT  --   --  66*  --   --   --  88*  --    AST  --   --  54*  --   --   --  63*  --     < > = values in this interval not displayed.

## 2023-02-08 NOTE — PROGRESS NOTES
S-(situation): Patient arrives to room 251 via cart from ED    B-(background): Here with UTI, n/v, diarrhea, word finding difficulties, and confusion.    A-(assessment): A&Ox4. Forgetful at times but easily reoriented. Having loose stools and voiding frequently. Up SBA to bathroom. Calls appropriately.    R-(recommendations): Orders reviewed with patient. Will monitor patient per MD orders.     Inpatient nursing criteria listed below were met:    Health care directives status obtained and documented: Yes  VTE ordered/documented: Yes  Skin issues/needs documented:No  Isolation addressed and Signage used: Yes  Fall Prevention: Care plan updated Yes Education given and documented Yes  Care Plan initiated and Co-Morbidities added: Yes  Education Assessment documented:Yes  Admission Education Documented: Yes  If present CAUTI/CLABI Education done: NA  New medication patient education completed and documented (Possible Side Effects of Common Medications handout): Yes  Allergies Reviewed: Yes  Admission Medication Reconciliation completed: Yes  Home medications if not able to send immediately home with family stored here: NA  Reminder note placed in discharge instructions regarding home meds: NA  Individualized care needs/preferences addressed and charted: Yes  Provider Notified that patient has arrived to the unit: Yes

## 2023-02-08 NOTE — PROGRESS NOTES
"S- Transfer to 218 from [facility].    B- Pt admitted 2/7 with septic shock/UTI    A- Brief systems assessment: PT alert and orientated, slow to respond, states she feels weak and tired, soft bps, IV fluids 100ml/Hr.    R- Transfer to ICU per physician orders. Continue to monitor pt and update physician as needed.      Code status: DNR  Skin: WDL  Fall Risk: Yes- Department fall risk interventions implemented.  Isolation and Signage: None  Medication drips upon transfer: none  Blue Bin checked and medications transfer out with patientYesABLE\"}    "

## 2023-02-09 ENCOUNTER — APPOINTMENT (OUTPATIENT)
Dept: CARDIOLOGY | Facility: CLINIC | Age: 68
DRG: 871 | End: 2023-02-09
Attending: PEDIATRICS
Payer: MEDICARE

## 2023-02-09 ENCOUNTER — APPOINTMENT (OUTPATIENT)
Dept: GENERAL RADIOLOGY | Facility: CLINIC | Age: 68
DRG: 871 | End: 2023-02-09
Attending: INTERNAL MEDICINE
Payer: MEDICARE

## 2023-02-09 ENCOUNTER — APPOINTMENT (OUTPATIENT)
Dept: MRI IMAGING | Facility: CLINIC | Age: 68
DRG: 871 | End: 2023-02-09
Attending: PEDIATRICS
Payer: MEDICARE

## 2023-02-09 LAB
ALBUMIN SERPL BCG-MCNC: 2.5 G/DL (ref 3.5–5.2)
ALP SERPL-CCNC: 236 U/L (ref 35–104)
ALT SERPL W P-5'-P-CCNC: 52 U/L (ref 10–35)
ANION GAP SERPL CALCULATED.3IONS-SCNC: 6 MMOL/L (ref 7–15)
AST SERPL W P-5'-P-CCNC: 40 U/L (ref 10–35)
BACTERIA UR CULT: ABNORMAL
BACTERIA UR CULT: ABNORMAL
BILIRUB SERPL-MCNC: 0.3 MG/DL
BUN SERPL-MCNC: 15.4 MG/DL (ref 8–23)
CALCIUM SERPL-MCNC: 8.4 MG/DL (ref 8.8–10.2)
CHLORIDE SERPL-SCNC: 99 MMOL/L (ref 98–107)
CREAT SERPL-MCNC: 0.94 MG/DL (ref 0.51–0.95)
CRP SERPL-MCNC: 323.54 MG/L
DEPRECATED HCO3 PLAS-SCNC: 29 MMOL/L (ref 22–29)
GFR SERPL CREATININE-BSD FRML MDRD: 66 ML/MIN/1.73M2
GLUCOSE BLDC GLUCOMTR-MCNC: 106 MG/DL (ref 70–99)
GLUCOSE BLDC GLUCOMTR-MCNC: 121 MG/DL (ref 70–99)
GLUCOSE BLDC GLUCOMTR-MCNC: 175 MG/DL (ref 70–99)
GLUCOSE SERPL-MCNC: 136 MG/DL (ref 70–99)
HGB BLD-MCNC: 9.5 G/DL (ref 11.7–15.7)
LVEF ECHO: NORMAL
MAGNESIUM SERPL-MCNC: 2.4 MG/DL (ref 1.7–2.3)
NT-PROBNP SERPL-MCNC: 3810 PG/ML (ref 0–900)
POTASSIUM SERPL-SCNC: 3.6 MMOL/L (ref 3.4–5.3)
PROT SERPL-MCNC: 5.4 G/DL (ref 6.4–8.3)
SODIUM SERPL-SCNC: 134 MMOL/L (ref 136–145)
WBC # BLD AUTO: 25 10E3/UL (ref 4–11)

## 2023-02-09 PROCEDURE — 250N000011 HC RX IP 250 OP 636: Performed by: PEDIATRICS

## 2023-02-09 PROCEDURE — 250N000013 HC RX MED GY IP 250 OP 250 PS 637: Performed by: PEDIATRICS

## 2023-02-09 PROCEDURE — 85018 HEMOGLOBIN: CPT | Performed by: PEDIATRICS

## 2023-02-09 PROCEDURE — 80053 COMPREHEN METABOLIC PANEL: CPT | Performed by: PEDIATRICS

## 2023-02-09 PROCEDURE — 255N000002 HC RX 255 OP 636: Performed by: PEDIATRICS

## 2023-02-09 PROCEDURE — 999N000208 ECHOCARDIOGRAM COMPLETE

## 2023-02-09 PROCEDURE — 83735 ASSAY OF MAGNESIUM: CPT | Performed by: PEDIATRICS

## 2023-02-09 PROCEDURE — 99291 CRITICAL CARE FIRST HOUR: CPT | Performed by: PEDIATRICS

## 2023-02-09 PROCEDURE — 999N000123 HC STATISTIC OXYGEN O2DAILY TECH TIME

## 2023-02-09 PROCEDURE — 86140 C-REACTIVE PROTEIN: CPT | Performed by: PEDIATRICS

## 2023-02-09 PROCEDURE — A9585 GADOBUTROL INJECTION: HCPCS | Performed by: PEDIATRICS

## 2023-02-09 PROCEDURE — 85048 AUTOMATED LEUKOCYTE COUNT: CPT | Performed by: PEDIATRICS

## 2023-02-09 PROCEDURE — 83880 ASSAY OF NATRIURETIC PEPTIDE: CPT | Performed by: PEDIATRICS

## 2023-02-09 PROCEDURE — 36592 COLLECT BLOOD FROM PICC: CPT | Performed by: PEDIATRICS

## 2023-02-09 PROCEDURE — 999N000157 HC STATISTIC RCP TIME EA 10 MIN

## 2023-02-09 PROCEDURE — 999N000156 HC STATISTIC RCP CONSULT EA 30 MIN

## 2023-02-09 PROCEDURE — 93306 TTE W/DOPPLER COMPLETE: CPT | Mod: 26 | Performed by: INTERNAL MEDICINE

## 2023-02-09 PROCEDURE — 255N000002 HC RX 255 OP 636: Performed by: INTERNAL MEDICINE

## 2023-02-09 PROCEDURE — 87040 BLOOD CULTURE FOR BACTERIA: CPT | Performed by: PEDIATRICS

## 2023-02-09 PROCEDURE — 258N000003 HC RX IP 258 OP 636: Performed by: PEDIATRICS

## 2023-02-09 PROCEDURE — 70553 MRI BRAIN STEM W/O & W/DYE: CPT | Mod: MG

## 2023-02-09 PROCEDURE — 200N000001 HC R&B ICU

## 2023-02-09 PROCEDURE — 71045 X-RAY EXAM CHEST 1 VIEW: CPT

## 2023-02-09 PROCEDURE — 36415 COLL VENOUS BLD VENIPUNCTURE: CPT | Performed by: PEDIATRICS

## 2023-02-09 RX ORDER — NOREPINEPHRINE BITARTRATE 0.02 MG/ML
.01-.6 INJECTION, SOLUTION INTRAVENOUS CONTINUOUS
Status: DISCONTINUED | OUTPATIENT
Start: 2023-02-09 | End: 2023-02-10

## 2023-02-09 RX ORDER — LORAZEPAM 0.5 MG/1
0.5 TABLET ORAL
Status: COMPLETED | OUTPATIENT
Start: 2023-02-09 | End: 2023-02-09

## 2023-02-09 RX ORDER — LOPERAMIDE HCL 2 MG
2 CAPSULE ORAL 4 TIMES DAILY PRN
Status: DISCONTINUED | OUTPATIENT
Start: 2023-02-09 | End: 2023-02-12 | Stop reason: HOSPADM

## 2023-02-09 RX ORDER — GADOBUTROL 604.72 MG/ML
7.5 INJECTION INTRAVENOUS ONCE
Status: COMPLETED | OUTPATIENT
Start: 2023-02-09 | End: 2023-02-09

## 2023-02-09 RX ADMIN — HUMAN ALBUMIN MICROSPHERES AND PERFLUTREN 3 ML: 10; .22 INJECTION, SOLUTION INTRAVENOUS at 14:03

## 2023-02-09 RX ADMIN — LORAZEPAM 0.5 MG: 0.5 TABLET ORAL at 11:42

## 2023-02-09 RX ADMIN — ACETAMINOPHEN 975 MG: 325 TABLET ORAL at 23:51

## 2023-02-09 RX ADMIN — ASPIRIN 325 MG: 325 TABLET ORAL at 08:51

## 2023-02-09 RX ADMIN — ENOXAPARIN SODIUM 40 MG: 40 INJECTION SUBCUTANEOUS at 11:10

## 2023-02-09 RX ADMIN — CEFTRIAXONE SODIUM 2 G: 2 INJECTION, POWDER, FOR SOLUTION INTRAMUSCULAR; INTRAVENOUS at 07:52

## 2023-02-09 RX ADMIN — ACETAMINOPHEN 975 MG: 325 TABLET ORAL at 03:22

## 2023-02-09 RX ADMIN — SODIUM CHLORIDE, POTASSIUM CHLORIDE, SODIUM LACTATE AND CALCIUM CHLORIDE: 600; 310; 30; 20 INJECTION, SOLUTION INTRAVENOUS at 08:51

## 2023-02-09 RX ADMIN — SODIUM CHLORIDE, POTASSIUM CHLORIDE, SODIUM LACTATE AND CALCIUM CHLORIDE: 600; 310; 30; 20 INJECTION, SOLUTION INTRAVENOUS at 01:33

## 2023-02-09 RX ADMIN — LEVOTHYROXINE SODIUM 75 MCG: 75 TABLET ORAL at 08:51

## 2023-02-09 RX ADMIN — ACETAMINOPHEN 975 MG: 325 TABLET ORAL at 15:04

## 2023-02-09 RX ADMIN — ATORVASTATIN CALCIUM 20 MG: 10 TABLET, FILM COATED ORAL at 08:51

## 2023-02-09 RX ADMIN — GADOBUTROL 7.5 ML: 604.72 INJECTION INTRAVENOUS at 12:43

## 2023-02-09 ASSESSMENT — ACTIVITIES OF DAILY LIVING (ADL)
ADLS_ACUITY_SCORE: 24
ADLS_ACUITY_SCORE: 20
ADLS_ACUITY_SCORE: 20
ADLS_ACUITY_SCORE: 24
ADLS_ACUITY_SCORE: 20
ADLS_ACUITY_SCORE: 24
ADLS_ACUITY_SCORE: 20
ADLS_ACUITY_SCORE: 24

## 2023-02-09 NOTE — PROVIDER NOTIFICATION
.Notified MD at 0250 AM regarding ectopy and line position..      Spoke with: Dr Hoff     Orders were obtained.    Comments: will pull the line back 4 cm as recommended by the radiologist Dr Harding. Then will re xray to confirm placement

## 2023-02-09 NOTE — PLAN OF CARE
"  Problem: Plan of Care - These are the overarching goals to be used throughout the patient stay.    Goal: Plan of Care Review  Description: The Plan of Care Review/Shift note should be completed every shift.  The Outcome Evaluation is a brief statement about your assessment that the patient is improving, declining, or no change.  This information will be displayed automatically on your shift note.  Outcome: Progressing  Goal: Patient-Specific Goal (Individualized)  Description: You can add care plan individualizations to a care plan. Examples of Individualization might be:  \"Parent requests to be called daily at 9am for status\", \"I have a hard time hearing out of my right ear\", or \"Do not touch me to wake me up as it startles me\".  Outcome: Progressing  Goal: Absence of Hospital-Acquired Illness or Injury  Outcome: Progressing  Intervention: Identify and Manage Fall Risk  Recent Flowsheet Documentation  Taken 2/9/2023 0000 by Rakan Shah RN  Safety Promotion/Fall Prevention:    activity supervised    bed alarm on    fall prevention program maintained    lighting adjusted    nonskid shoes/slippers when out of bed    room near nurse's station    room organization consistent    safety round/check completed    supervised activity  Taken 2/8/2023 2000 by Rakan Shah RN  Safety Promotion/Fall Prevention:    activity supervised    bed alarm on    fall prevention program maintained    lighting adjusted    nonskid shoes/slippers when out of bed    room near nurse's station    room organization consistent    safety round/check completed    supervised activity  Intervention: Prevent Skin Injury  Recent Flowsheet Documentation  Taken 2/9/2023 0000 by Rakan Shah RN  Body Position: position changed independently  Taken 2/8/2023 2000 by Rakan Shah RN  Body Position: position changed independently  Intervention: Prevent and Manage VTE (Venous Thromboembolism) Risk  Recent Flowsheet Documentation  Taken 2/9/2023 " 0000 by Rakan Shah RN  VTE Prevention/Management: other (see comments)  Taken 2/8/2023 2000 by Rakan Shah RN  VTE Prevention/Management: other (see comments)  Goal: Optimal Comfort and Wellbeing  Outcome: Progressing  Intervention: Provide Person-Centered Care  Recent Flowsheet Documentation  Taken 2/9/2023 0000 by Rakan Shah RN  Trust Relationship/Rapport: care explained  Taken 2/8/2023 2000 by Rakan Shah RN  Trust Relationship/Rapport: care explained  Goal: Readiness for Transition of Care  Outcome: Progressing     Problem: UTI (Urinary Tract Infection)  Goal: Improved Infection Symptoms  Outcome: Progressing     Problem: Hypertension Comorbidity  Goal: Blood Pressure in Desired Range  Outcome: Progressing  Intervention: Maintain Blood Pressure Management  Recent Flowsheet Documentation  Taken 2/9/2023 0000 by Rakan Shah RN  Medication Review/Management: medications reviewed  Taken 2/8/2023 2000 by Rakan Shah RN  Medication Review/Management: medications reviewed       Pt remains alert and oriented, has been steady stand by assist, blood pressures remained stable at 0.03 but was recently paused after tele had shown a sinus rhythm with ectopic beats. It is also suspected that the displayed rhythms may have been from the PICC line placement. Pt has otherwise been vitally stable, oxygen in place via nasal canula was turned up to 3 LPM after some minor desaturation to 89 percent.   Will continue to monitor blood pressures, telemetry, titrate levophed as needed, and follow plan of care.

## 2023-02-09 NOTE — PROGRESS NOTES
Blood pressures have remained stable although border line low for the duration of the shift. Levophed remains paused. Pt remains alert oriented and otherwise vitally stable.

## 2023-02-09 NOTE — PROGRESS NOTES
[facility]    Medicine Progress Note - Hospitalist Service    Date of Admission:  2/7/2023    Assessment & Plan   67-year-old woman with hypertension, hypothyroidism, and remote history of endometrial cancer presented with confusion and was admitted due to concerns for urosepsis, encephalopathy, gastroenteritis, and electrolyte disturbances.  She subsequently worsened with concern for potentially life-threatening septic shock due to gram-negative bacteremia and UTI and was transferred to ICU on 2/8.  She now appears to be stabilizing.    Septic shock due to E. coli UTI and bacteremia with encephalopathy, acute kidney injury, and demand myocardial ischemia  SIRS criteria present on admission with blood and urine cultures growing E. coli on preliminary results concerning for sepsis.  Also presented with signs of organ dysfunction including encephalopathy, acute kidney injury, and demand myocardial ischemia.  Had persistent hypotension despite aggressive fluid resuscitation concerning for septic shock.  Required vasopressors for several hours overnight 2/8-2/9 but has now been off vasopressors for about 9 hours although blood pressure continues to be lower than her normal baseline.  Clinically improving with resolving encephalopathy and acute kidney injury.  -Continue empiric ceftriaxone while awaiting culture results  -Continue ICU monitoring using norepinephrine infusion as needed to keep mean arterial pressure over 65, switching from peripheral to central norepinephrine infusion after PICC line was placed overnight  -Advance diet and may start to advance activity today as tolerated  -Blood cultures repeated  -Continue to monitor urine output and renal function closely  -Continue to monitor neurologic status, anticipate brain MRI today as previously advised by stroke neurology after their initial evaluation in the ER    Acute hypoxic respiratory failure  Reporting worsening dyspnea and  has been severely hypoxic requiring oxygen supplementation.  Radiographic findings are suspicious for atelectasis at the left base but also possible interstitial pulmonary edema most likely due to sepsis because she is not known to have underlying heart disease.  So far hypoxia has been responsive to low-flow oxygen supplementation.  -Check BNP  -Decrease IV fluid rate as her hemodynamic status stabilizes, avoid other aggressive IV fluid treatment for hypotension, and treat recurrent hypotension with vasopressors  -Reconsider IV Lasix administration if there is increasing concern for pulmonary edema, worsening hypoxia, and blood pressure is stable  -reconsider echocardiogram if there is concern for cardiogenic pulmonary edema and/or heart failure    Diarrhea, nausea, and vomiting  Presented with nausea, vomiting, and diarrhea probably due to sepsis and exacerbated by hypotension.  Not having abdominal pain.  No intestinal infection identified.  GI symptoms are starting to improve.  -Advance diet as tolerated  -Use antiemetics and/or antidiarrheal medication as needed    Hypokalemia  Hypomagnesemia  Presented with electrolyte disturbances including hypokalemia and hypomagnesemia likely exacerbated by diarrhea and vomiting.  Electrolytes have improved after supplementation.  -Continue to monitor electrolytes  -Continue magnesium and/or potassium supplementation by protocol as indicated    Anemia, unspecified type  Hemoglobin has decreased almost 3 g during hospitalization.  Active bleeding not apparent and hemolysis not suspected based on lab results.  Anemia due to hemodilution and/or sepsis is possible.  Anemia appears to have stabilized over the last 24 hours.  -Follow hemoglobin    Abnormal liver function tests  Hypoalbuminemia  Presented with abnormal liver function tests and has had low serum albumin.  These have been stable.  Radiographic ultrasound findings were reportedly suspicious for hepatitis per  interpreting radiologist.  However, infectious hepatitis has not been strongly suspected clinically.  Nonalcoholic steatohepatitis seems more likely.  -Monitor as indicated  -Check hepatitis viral serologies given the combination of abnormal radiographic findings, abnormal liver function test, and GI symptoms    Hypertension  Hyperlipidemia  Chronic normally taking hydrochlorothiazide, statin, and aspirin.  Blood pressure continues to be lower than her normal baseline in context of septic shock.  -Hold chronic hydrochlorothiazide  -Continue chronic statin and aspirin    Hypothyroidism  Chronically treated with levothyroxine.  Unknown when most recent thyroid function testing was performed.  -Check TSH and free T4 in context of GI symptoms  -Continuing chronic dose of levothyroxine for now    History of endometrial cancer  Remote history, not thought to be active at this time.  -Clinical monitoring         Diet: Combination Diet Regular Diet Adult    DVT Prophylaxis: Enoxaparin (Lovenox) SQ  Dueñas Catheter: Not present  Lines:   PICC 02/08/23 Double Lumen Right Basilic-Site Assessment: WDL      Cardiac Monitoring: ACTIVE order. Indication: ICU  Code Status: No CPR- Do NOT Intubate      Clinically Significant Risk Factors                        Disposition Plan      Expected Discharge Date: 02/10/2023                  Blade Guevara MD  Hospitalist Service  [facility]  Securely message with [employer] (more info)  Text page via [facility] Paging/Directory   ______________________________________________________________________    Interval History   She feels more short of breath today.  She feels somewhat short of breath at rest but worsened with activity.  She has been out of bed to use commode but has not otherwise been active after transfer to the ICU yesterday.  She is not having dizziness today.  She was nauseated this morning but has been able to tolerate some oral intake and has not  vomited today.  She is continuing to have loose stool but they are less frequent and starting to have more formed.  She denies any abdominal pain.  She remains afebrile but reports that she had been having chills at home prior to hospitalization.  Overnight she required norepinephrine infusion for about 5 hours due to worsening hypotension.  PICC line was placed overnight.  Blood pressure improved and she weaned off of Levophed at about 2 AM.  Oxygen supplementation continues but oxygen requirement has been stable overnight.  She has been oliguric.  She had 2 large loose incontinent stools yesterday and overnight.    Physical Exam   Vital Signs: Temp: 97.7  F (36.5  C) Temp src: Oral BP: 101/60 Pulse: 79   Resp: 21 SpO2: 95 % O2 Device: Nasal cannula Oxygen Delivery: 3 LPM   Patient Vitals for the past 24 hrs:   BP Temp Temp src Pulse Resp SpO2   02/09/23 1030 101/60 -- -- 79 -- 95 %   02/09/23 1000 109/63 -- -- 84 -- 94 %   02/09/23 0930 107/59 -- -- 82 -- 94 %   02/09/23 0900 94/50 -- -- 84 -- 95 %   02/09/23 0830 101/55 -- -- 76 -- 95 %   02/09/23 0800 90/54 97.7  F (36.5  C) Oral 79 -- (!) 89 %   02/09/23 0730 99/54 -- -- 71 -- --   02/09/23 0700 98/58 -- -- 73 21 95 %   02/09/23 0600 107/59 -- -- 74 18 95 %   02/09/23 0500 102/48 -- -- 74 17 93 %   02/09/23 0430 108/48 -- -- -- -- --   02/09/23 0400 (!) 100/38 -- -- 82 25 91 %   02/09/23 0322 -- -- -- -- -- 95 %   02/09/23 0300 94/45 -- -- -- -- --   02/09/23 0201 -- -- -- -- -- (!) 89 %   02/09/23 0200 112/50 -- -- 81 23 --   02/09/23 0100 119/73 99.6  F (37.6  C) Oral 96 20 91 %   02/09/23 0000 102/47 -- -- 75 24 92 %   02/08/23 2300 104/50 -- -- 81 11 93 %   02/08/23 2200 -- -- -- 69 24 92 %   02/08/23 2130 101/58 -- -- 67 18 93 %   02/08/23 2100 103/58 -- -- 65 22 94 %   02/08/23 2000 95/52 98.9  F (37.2  C) Oral 75 16 93 %   02/08/23 1900 102/53 -- -- 75 18 94 %   02/08/23 1845 104/48 -- -- 76 22 91 %   02/08/23 1830 105/55 -- -- 78 19 91 %   02/08/23 1815  103/57 -- -- 76 19 93 %   02/08/23 1803 104/56 -- -- 77 17 93 %   02/08/23 1800 102/54 -- -- 79 11 93 %   02/08/23 1715 (!) 81/62 -- -- 76 25 97 %   02/08/23 1700 104/55 -- -- 74 23 92 %   02/08/23 1645 104/57 -- -- 71 13 92 %   02/08/23 1630 97/60 -- -- 72 11 94 %   02/08/23 1622 91/51 -- -- 61 25 92 %   02/08/23 1615 -- -- -- 70 23 94 %   02/08/23 1600 103/54 -- -- 68 16 94 %   02/08/23 1545 -- -- -- 68 20 96 %   02/08/23 1530 -- -- -- 66 19 96 %   02/08/23 1515 (!) 87/39 -- -- 70 21 91 %   02/08/23 1500 95/61 -- -- 72 22 91 %   02/08/23 1445 90/60 -- -- 70 20 93 %   02/08/23 1430 97/50 -- -- 73 20 94 %   02/08/23 1424 (!) 88/59 -- -- 73 23 95 %   02/08/23 1415 -- -- -- 73 18 96 %   02/08/23 1406 97/57 -- -- 75 16 95 %   02/08/23 1404 97/57 98  F (36.7  C) Oral 75 25 (!) 87 %   02/08/23 1317 92/46 -- -- 80 -- 91 %   02/08/23 1249 96/77 -- -- -- -- 90 %   02/08/23 1215 91/52 -- -- 80 -- 90 %   02/08/23 1130 93/49 -- -- 80 -- 98 %     Weight: 179 lbs 11.2 oz     Intake/Output Summary (Last 24 hours) at 2/9/2023 1046  Last data filed at 2/9/2023 0000  Gross per 24 hour   Intake 905 ml   Output 600 ml   Net 305 ml     General Appearance: Ill-appearing elderly woman without signs of acute distress while sitting up in bed  Respiratory: Normal respiratory effort, easily speaks full sentences, inspiratory crackles present at the bases to midlung fields bilaterally, no wheezes  Cardiovascular: Regular rate and rhythm, good radial pulse, brisk capillary refill, no peripheral edema, no JVD  GI: Nondistended abdomen, soft, nontender  Skin: Pale color, no rash  Neuro: Alert and maintains wakefulness and attention, normal speech, purposefully and symmetrically moves limbs, no obvious focal deficit    Medical Decision Making     57 minutes spent so far in critical care service today including time spent reviewing hemodynamic status and adjusting IV fluid treatment and vasopressor treatment of potentially life-threatening septic  shock.      Data     I have personally reviewed the following data over the past 24 hrs:    25.0 (H)  \   9.5 (L)   / N/A     134 (L) 99 15.4 /  106 (H)   3.6 29 0.94 \       ALT: 52 (H) AST: 40 (H) AP: 236 (H) TBILI: 0.3   ALB: 2.5 (L) TOT PROTEIN: 5.4 (L) LIPASE: N/A       Procal: N/A CRP: 323.54 (H) Lactic Acid: 1.1          Magnesium 2.4  Urine culture is growing E. coli and preliminary results  Blood cultures from February 7 are growing gram-negative rods with preliminary identification as E. coli based on Verigene testing  Blood cultures from February 8 and 9 are in process  Blood sugars ranged 106-193 over the last day with only 1 reading greater than 150    Imaging results reviewed over the past 24 hrs:   Recent Results (from the past 24 hour(s))   XR Chest 1 View    Narrative    EXAM: XR CHEST 1 VIEW  LOCATION: [facility]  DATE/TIME: 2/8/2023 11:17 PM    INDICATION: RN placed PICC   verify tip placement  COMPARISON: 02/07/2023      Impression    IMPRESSION: Right PICC tip in upper right atrium. No pneumothorax or significant changes.   XR Chest Port 1 View    Narrative    EXAM: XR CHEST PORT 1 VIEW  LOCATION: [facility]  DATE/TIME: 2/9/2023 3:44 AM    INDICATION: eval PICC line placement  COMPARISON: None.      Impression    IMPRESSION: Right PICC tip in the mid SVC. Interstitial prominence. Mild left basilar atelectasis. Normal heart size. No pneumothorax.     Recent Labs   Lab 02/09/23  0749 02/09/23  0536 02/08/23  2323 02/08/23  1659 02/08/23  1158 02/08/23  0537 02/08/23  0532 02/07/23  2359 02/07/23  2254 02/07/23  1753   WBC  --  25.0*  --   --   --  26.2*  --   --   --  28.0*   HGB  --  9.5*  --   --  9.6* 10.3*  --   --   --  12.3   MCV  --   --   --   --   --  93  --   --   --  91   PLT  --   --   --   --   --  162  --   --   --  208   INR  --   --   --   --   --   --   --   --   --  1.09   NA  --  134*  --   --   --   --  134*  --    --  135*   POTASSIUM  --  3.6  --   --   --   --  4.1  4.1 2.8*   < > 2.9*   CHLORIDE  --  99  --   --   --   --  98  --   --  91*   CO2  --  29  --   --   --   --  26  --   --  26   BUN  --  15.4  --   --   --   --  22.1  --   --  21.8   CR  --  0.94  --   --   --   --  1.24*  --   --  1.33*   ANIONGAP  --  6*  --   --   --   --  10  --   --  18*   FADY  --  8.4*  --   --   --   --  8.7*  --   --  9.5   * 136* 193*   < >  --   --  123*  --   --  99   ALBUMIN  --  2.5*  --   --   --   --  2.7*  --   --  3.5   PROTTOTAL  --  5.4*  --   --   --   --  5.8*  --   --  7.1   BILITOTAL  --  0.3  --   --   --   --  0.5  --   --  0.7   ALKPHOS  --  236*  --   --   --   --  135*  --   --  156*   ALT  --  52*  --   --   --   --  66*  --   --  88*   AST  --  40*  --   --   --   --  54*  --   --  63*    < > = values in this interval not displayed.

## 2023-02-09 NOTE — PROVIDER NOTIFICATION
Repeat CXR showed PICC in the mid SVC discussed with Tran MONTERROSO from [facility] [employer] and she stated line ok to use with this placement

## 2023-02-09 NOTE — PROCEDURES
[facility]    Double Lumen PICC Placement    Date/Time: 2/8/2023 11:18 PM  Performed by: Ashanti Fatima RN  Authorized by: Isabel Hoff MD   Indications: vascular access      UNIVERSAL PROTOCOL   Site Marked: Yes  Prior Images Obtained and Reviewed:  NA  Required items: Required blood products, implants, devices and special equipment available    Patient identity confirmed:  Verbally with patient, arm band and hospital-assigned identification number  NA - No sedation, light sedation, or local anesthesia  Confirmation Checklist:  Patient's identity using two indicators, correct equipment/implants were available, relevant allergies and procedure was appropriate and matched the consent or emergent situation  Time out: Immediately prior to the procedure a time out was called    Universal Protocol: the Joint Commission Universal Protocol was followed    Preparation: Patient was prepped and draped in usual sterile fashion       ANESTHESIA    Anesthesia: See MAR for details  Local Anesthetic:  Lidocaine 1% without epinephrine  Anesthetic Total (mL):  3      SEDATION    Patient Sedated: No        Preparation: skin prepped with ChloraPrep  Skin prep agent: skin prep agent completely dried prior to procedure  Sterile barriers: maximum sterile barriers were used: cap, mask, sterile gown, sterile gloves, and large sterile sheet  Hand hygiene: hand hygiene performed prior to central venous catheter insertion  Type of line used: PICC  Catheter type: double lumen  Lumen type: power PICC and valved  Lumen Identification: Red and Purple  Catheter size: 5 Fr  Brand: Bard  Lot number: [id number]  Placement method: venipuncture, MST and ultrasound  Number of attempts: 1  Difficulty threading catheter: no  Successful placement: yes  Orientation: right  Catheter to Vein (%): 15  Location: basilic vein  Tip Location: SVC/RA Junction  Arm circumference: adults 15 cm  Extremity circumference:  37  Visible catheter length: 2  Total catheter length: 44  Dressing and securement: blood removed, chlorhexidine disc applied, dressing applied, blood cleaned with CHG, gloves changed prior to final dressing, securement device, site cleansed, statlock, sterile dressing applied and transparent dressing  Post procedure assessment: blood return through all ports and placement verified by x-ray  PROCEDURE Describe Procedure: Catheter tip in low SVC  Disposal: sharps and needle count correct at the end of procedure, needles and guidewire disposed in sharps container

## 2023-02-10 PROBLEM — B96.20 E. COLI UTI: Status: ACTIVE | Noted: 2023-02-07

## 2023-02-10 PROBLEM — B96.20 E COLI BACTEREMIA: Status: ACTIVE | Noted: 2023-02-08

## 2023-02-10 PROBLEM — J96.01 ACUTE RESPIRATORY FAILURE WITH HYPOXIA (H): Status: ACTIVE | Noted: 2023-02-10

## 2023-02-10 PROBLEM — N39.0 E. COLI UTI: Status: ACTIVE | Noted: 2023-02-07

## 2023-02-10 LAB
ANION GAP SERPL CALCULATED.3IONS-SCNC: 6 MMOL/L (ref 7–15)
BACTERIA BLD CULT: ABNORMAL
BUN SERPL-MCNC: 11.4 MG/DL (ref 8–23)
CALCIUM SERPL-MCNC: 8.5 MG/DL (ref 8.8–10.2)
CHLORIDE SERPL-SCNC: 102 MMOL/L (ref 98–107)
CREAT SERPL-MCNC: 0.91 MG/DL (ref 0.51–0.95)
CRP SERPL-MCNC: 295.7 MG/L
DEPRECATED HCO3 PLAS-SCNC: 29 MMOL/L (ref 22–29)
GFR SERPL CREATININE-BSD FRML MDRD: 68 ML/MIN/1.73M2
GLUCOSE BLDC GLUCOMTR-MCNC: 142 MG/DL (ref 70–99)
GLUCOSE SERPL-MCNC: 109 MG/DL (ref 70–99)
HBA1C MFR BLD: 5.5 %
HGB BLD-MCNC: 9.7 G/DL (ref 11.7–15.7)
MAGNESIUM SERPL-MCNC: 2.3 MG/DL (ref 1.7–2.3)
PLATELET # BLD AUTO: 193 10E3/UL (ref 150–450)
POTASSIUM SERPL-SCNC: 3.5 MMOL/L (ref 3.4–5.3)
SODIUM SERPL-SCNC: 137 MMOL/L (ref 136–145)
T4 FREE SERPL-MCNC: 0.93 NG/DL (ref 0.9–1.7)
TSH SERPL DL<=0.005 MIU/L-ACNC: 2.87 UIU/ML (ref 0.3–4.2)
WBC # BLD AUTO: 19.6 10E3/UL (ref 4–11)

## 2023-02-10 PROCEDURE — 99233 SBSQ HOSP IP/OBS HIGH 50: CPT | Performed by: PEDIATRICS

## 2023-02-10 PROCEDURE — 258N000003 HC RX IP 258 OP 636: Performed by: PEDIATRICS

## 2023-02-10 PROCEDURE — 85049 AUTOMATED PLATELET COUNT: CPT | Performed by: PEDIATRICS

## 2023-02-10 PROCEDURE — 250N000013 HC RX MED GY IP 250 OP 250 PS 637: Performed by: INTERNAL MEDICINE

## 2023-02-10 PROCEDURE — 80048 BASIC METABOLIC PNL TOTAL CA: CPT | Performed by: PEDIATRICS

## 2023-02-10 PROCEDURE — 84439 ASSAY OF FREE THYROXINE: CPT | Performed by: PEDIATRICS

## 2023-02-10 PROCEDURE — 250N000013 HC RX MED GY IP 250 OP 250 PS 637: Performed by: PEDIATRICS

## 2023-02-10 PROCEDURE — 250N000009 HC RX 250: Performed by: INTERNAL MEDICINE

## 2023-02-10 PROCEDURE — 85048 AUTOMATED LEUKOCYTE COUNT: CPT | Performed by: PEDIATRICS

## 2023-02-10 PROCEDURE — 86140 C-REACTIVE PROTEIN: CPT | Performed by: PEDIATRICS

## 2023-02-10 PROCEDURE — 85018 HEMOGLOBIN: CPT | Performed by: PEDIATRICS

## 2023-02-10 PROCEDURE — 36415 COLL VENOUS BLD VENIPUNCTURE: CPT | Performed by: PEDIATRICS

## 2023-02-10 PROCEDURE — 83735 ASSAY OF MAGNESIUM: CPT | Performed by: PEDIATRICS

## 2023-02-10 PROCEDURE — 83036 HEMOGLOBIN GLYCOSYLATED A1C: CPT | Performed by: PEDIATRICS

## 2023-02-10 PROCEDURE — 84443 ASSAY THYROID STIM HORMONE: CPT | Performed by: PEDIATRICS

## 2023-02-10 PROCEDURE — 250N000011 HC RX IP 250 OP 636: Performed by: PEDIATRICS

## 2023-02-10 PROCEDURE — 120N000001 HC R&B MED SURG/OB

## 2023-02-10 RX ORDER — IPRATROPIUM BROMIDE AND ALBUTEROL SULFATE 2.5; .5 MG/3ML; MG/3ML
3 SOLUTION RESPIRATORY (INHALATION) EVERY 4 HOURS PRN
Status: DISCONTINUED | OUTPATIENT
Start: 2023-02-10 | End: 2023-02-12 | Stop reason: HOSPADM

## 2023-02-10 RX ORDER — ALBUTEROL SULFATE 90 UG/1
2 AEROSOL, METERED RESPIRATORY (INHALATION) EVERY 4 HOURS PRN
Status: DISCONTINUED | OUTPATIENT
Start: 2023-02-10 | End: 2023-02-12 | Stop reason: HOSPADM

## 2023-02-10 RX ORDER — FUROSEMIDE 10 MG/ML
10 INJECTION INTRAMUSCULAR; INTRAVENOUS ONCE
Status: COMPLETED | OUTPATIENT
Start: 2023-02-10 | End: 2023-02-10

## 2023-02-10 RX ADMIN — IPRATROPIUM BROMIDE AND ALBUTEROL SULFATE 3 ML: 2.5; .5 SOLUTION RESPIRATORY (INHALATION) at 00:18

## 2023-02-10 RX ADMIN — ASPIRIN 325 MG: 325 TABLET ORAL at 08:00

## 2023-02-10 RX ADMIN — ACETAMINOPHEN 975 MG: 325 TABLET ORAL at 07:55

## 2023-02-10 RX ADMIN — ENOXAPARIN SODIUM 40 MG: 40 INJECTION SUBCUTANEOUS at 11:25

## 2023-02-10 RX ADMIN — FUROSEMIDE 10 MG: 10 INJECTION, SOLUTION INTRAVENOUS at 17:14

## 2023-02-10 RX ADMIN — ACETAMINOPHEN 975 MG: 325 TABLET ORAL at 13:40

## 2023-02-10 RX ADMIN — ATORVASTATIN CALCIUM 20 MG: 10 TABLET, FILM COATED ORAL at 08:00

## 2023-02-10 RX ADMIN — ALBUTEROL SULFATE 2 PUFF: 90 AEROSOL, METERED RESPIRATORY (INHALATION) at 23:51

## 2023-02-10 RX ADMIN — LEVOTHYROXINE SODIUM 75 MCG: 75 TABLET ORAL at 08:00

## 2023-02-10 RX ADMIN — ACETAMINOPHEN 975 MG: 325 TABLET ORAL at 19:48

## 2023-02-10 RX ADMIN — CEFTRIAXONE SODIUM 2 G: 2 INJECTION, POWDER, FOR SOLUTION INTRAMUSCULAR; INTRAVENOUS at 06:48

## 2023-02-10 RX ADMIN — SODIUM CHLORIDE, POTASSIUM CHLORIDE, SODIUM LACTATE AND CALCIUM CHLORIDE: 600; 310; 30; 20 INJECTION, SOLUTION INTRAVENOUS at 07:39

## 2023-02-10 ASSESSMENT — ACTIVITIES OF DAILY LIVING (ADL)
ADLS_ACUITY_SCORE: 20
ADLS_ACUITY_SCORE: 26
ADLS_ACUITY_SCORE: 20
ADLS_ACUITY_SCORE: 20
ADLS_ACUITY_SCORE: 26
ADLS_ACUITY_SCORE: 26
ADLS_ACUITY_SCORE: 20
ADLS_ACUITY_SCORE: 26
ADLS_ACUITY_SCORE: 26

## 2023-02-10 NOTE — PLAN OF CARE
"Goal Outcome Evaluation:      Plan of Care Reviewed With: patient, family         Outcome Evaluation: Patient is A&Ox4. Tylenol given x2 for headache. Afebrile and vitals stable. Patient is on 2L NC in the mid 90s. Lungs are diminished and SOB reported with activity. LR discontinued and one time dose of 10mg Lasix given for weight increase. No edema noted but patient states her legs look more \"puffy\" than they normally are. Tolerating diet. BM x3 and voiding well. Tele was NSR but discontinued. Mg and K+ recheck in the morning. PICC and PIV SL.    Patient transferred out of  into Med/Surg 249.       "

## 2023-02-10 NOTE — PLAN OF CARE
Goal Outcome Evaluation:           Overall Patient Progress: no changeOverall Patient Progress: no change         VSS. Afebrile. Pt c/o headache at start of shift. PRN tylenol given which has been effective. Pt also c/o SOB this shift. With audible wheezing noted. MD notified with new order for PRN duo neb. Neb given which was effective. Pt up to BR with SBA. Alert and oriented. Calm and cooperative. No concerns at this time.

## 2023-02-10 NOTE — PROGRESS NOTES
[facility]    Medicine Progress Note - Hospitalist Service    Date of Admission:  2/7/2023    Assessment & Plan   67-year-old woman with hypertension, hypothyroidism, and remote history of endometrial cancer presented with confusion and was admitted due to concerns for urosepsis, encephalopathy, gastroenteritis, and electrolyte disturbances.  She subsequently worsened with concern for potentially life-threatening septic shock due to gram-negative bacteremia and UTI and was transferred to ICU on 2/8.  She now appears to be stabilizing.    Septic shock due to E. coli UTI and bacteremia with encephalopathy, acute kidney injury, acute hypoxic respiratory failure, and demand myocardial ischemia  SIRS criteria present on admission along with signs of organ dysfunction including encephalopathy, acute kidney injury, and demand myocardial ischemia concerning for severe sepsis.  Had persistent hypotension despite aggressive fluid resuscitation concerning for septic shock and required vasopressors for several hours overnight 2/8-2/9.  Initial blood and urine cultures grew E. coli.  Repeat blood cultures after starting antibiotics are negative so far.  Has not had persistent focal neurologic symptoms or confusion and no signs of stroke on MRI.  Clinically improving with resolving encephalopathy and acute kidney injury and improving respiratory failure.  -Continue empiric ceftriaxone  -Transfer to floor off cardiac monitoring today   -continue PICC line today but may be able to remove it within the next 24 hours if she remains stable  -Continue to advance diet and activity as tolerated  -Blood cultures repeated  -Continue to monitor urine output and renal function closely    Acute hypoxic respiratory failure  Noncardiogenic acute pulmonary edema due to sepsis  Mild pulmonary hypertension  Worsening dyspnea with severe hypoxia requiring oxygen supplementation concerning for acute hypoxic  respiratory failure.  Radiographic findings are suspicious for atelectasis at the left base but also interstitial pulmonary edema most likely due to noncardiogenic pulmonary edema from sepsis.  Although troponin and BNP were mildly elevated, she is not known to have underlying heart disease and echo results were reassuring, so neither acute MI nor acute CHF are suspected.  Incidental note was made of mild pulmonary hypertension on echocardiogram but it is unclear whether this is acute or chronic.  Hypoxia has been responsive to low-flow oxygen supplementation with decreasing oxygen requirement.  -Discontinue IV fluid  -Administer 1 dose IV Lasix today  -Wean oxygen supplementation for saturations above 90%  -Discussed outpatient follow-up with PCP regarding pulmonary hypertension    Diarrhea, nausea, and vomiting  Presented with nausea, vomiting, and diarrhea probably due to sepsis and exacerbated by hypotension.  Not having abdominal pain.  No intestinal infection identified.  GI symptoms are starting to improve.  -Advance diet as tolerated  -Use antiemetics and/or antidiarrheal medication as needed    Hypokalemia  Hypomagnesemia  Presented with electrolyte disturbances including hypokalemia and hypomagnesemia likely exacerbated by diarrhea and vomiting.  Electrolytes have improved after supplementation.  -Continue to monitor electrolytes  -Continue magnesium and/or potassium supplementation by protocol as indicated    Anemia, unspecified type  Hemoglobin has decreased almost 3 g during hospitalization.  Active bleeding not apparent and hemolysis not suspected based on lab results.  Anemia due to hemodilution and/or sepsis is possible.  Anemia has stabilized over the last 48 hours.  -Follow hemoglobin    Abnormal liver function tests  Hypoalbuminemia  Presented with abnormal liver function tests and has had low serum albumin.  These have been stable.  Radiographic ultrasound findings were reportedly suspicious for  hepatitis per interpreting radiologist.  However, infectious hepatitis has not been strongly suspected clinically.  Nonalcoholic steatohepatitis seems more likely.  -Monitor as indicated  -Check hepatitis viral serologies given the combination of abnormal radiographic findings, abnormal liver function test, and GI symptoms    Hypertension  Hyperlipidemia  Chronic normally taking hydrochlorothiazide, statin, and aspirin.  Blood pressure continues to be lower than her normal baseline in context of septic shock.  -Hold chronic hydrochlorothiazide  -Continue chronic statin and aspirin    Hypothyroidism  Chronically treated with levothyroxine.  Unknown when most recent thyroid function testing was performed.  -Check TSH and free T4 in context of GI symptoms  -Continuing chronic dose of levothyroxine for now    History of endometrial cancer  Remote history, not thought to be active at this time.  -Clinical monitoring           Diet: Combination Diet Regular Diet Adult    DVT Prophylaxis: Enoxaparin (Lovenox) SQ  Dueñas Catheter: Not present  Lines:   PICC 02/08/23 Double Lumen Right Basilic-Site Assessment: WDL      Cardiac Monitoring: None  Code Status: No CPR- Do NOT Intubate      Clinically Significant Risk Factors                        Disposition Plan   Anticipate probable discharge home once medically stable, possibly 2 to 3 days          Blade Guevara MD  Hospitalist Service  [facility]  Securely message with [employer] (more info)  Text page via [employer] Paging/Directory   ______________________________________________________________________    Interval History   She had increasing shortness of breath last night and was audibly wheezing, so she was given a DuoNeb treatment which helped relieve her dyspnea and wheezing.  There were no other significant overnight events.  She thinks she is starting to feel better.  Her dyspnea is improved although persists.  She continues to have loose  stools but denies any abdominal pain or nausea.  She remains afebrile and has been hemodynamically stable.  Oxygenation is improving with decreasing oxygen requirement.  She is tolerating oral intake.  Urine output has been adequate.    Physical Exam   Vital Signs: Temp: 97.7  F (36.5  C) Temp src: Oral BP: 107/59 Pulse: 75   Resp: 26 SpO2: 94 % O2 Device: Nasal cannula Oxygen Delivery: 2 LPM   Patient Vitals for the past 24 hrs:   BP Temp Temp src Pulse Resp SpO2 Weight   02/10/23 1515 107/59 97.7  F (36.5  C) Oral 75 26 94 % --   02/10/23 1355 -- -- -- -- -- (!) 86 % --   02/10/23 1335 -- -- -- -- -- 92 % --   02/10/23 1300 117/55 -- -- 81 29 96 % --   02/10/23 1130 -- 98  F (36.7  C) Oral -- -- -- --   02/10/23 1100 -- -- -- 74 21 94 % --   02/10/23 1000 105/48 -- -- 77 17 91 % --   02/10/23 0900 -- -- -- 74 23 94 % --   02/10/23 0800 109/62 98.5  F (36.9  C) Oral 80 (!) 34 93 % --   02/10/23 0600 105/59 -- -- 72 25 97 % 87.4 kg (192 lb 11.2 oz)   02/10/23 0400 103/54 97.6  F (36.4  C) Oral 72 15 96 % --   02/10/23 0200 108/58 -- -- 78 18 96 % --   02/10/23 0030 -- -- -- 86 15 94 % --   02/10/23 0000 122/73 -- -- 86 (!) 32 92 % --   02/09/23 2351 -- 97.5  F (36.4  C) Oral 88 (!) 36 (!) 87 % --   02/09/23 2200 122/68 -- -- 77 23 90 % --   02/09/23 2000 105/64 98.7  F (37.1  C) Oral 71 20 95 % --   02/09/23 1900 101/56 -- -- 76 18 97 % --   02/09/23 1800 109/57 -- -- 81 23 93 % --   02/09/23 1700 103/55 -- -- 75 20 94 % --     Weight: 192 lbs 11.2 oz   Vitals:    02/07/23 1739 02/07/23 2306 02/10/23 0600   Weight: 80.4 kg (177 lb 4.8 oz) 81.5 kg (179 lb 11.2 oz) 87.4 kg (192 lb 11.2 oz)       Intake/Output Summary (Last 24 hours) at 2/10/2023 1652  Last data filed at 2/10/2023 1415  Gross per 24 hour   Intake 5121 ml   Output --   Net 5121 ml     General Appearance: Pale elderly woman without signs of acute distress while resting in bed  Respiratory: Normal respiratory effort, easily speaks full sentences, few  faint inspiratory crackles bilaterally, no wheezes  Cardiovascular: Regular rate and rhythm, good radial pulse, normal capillary refill, no peripheral edema  GI: Nondistended abdomen, soft, nontender  Skin: Pale color  Neuro: Alert and maintains wakefulness and attention, normal speech    Medical Decision Making           Data     I have personally reviewed the following data over the past 24 hrs:    19.6 (H)  \   9.7 (L)   / 193     137 102 11.4 /  142 (H)   3.5 29 0.91 \       TSH: N/A T4: N/A A1C: 5.5       Procal: N/A CRP: 295.70 (H) Lactic Acid: N/A          Blood sugars ranged 106-175 over the last day, hemoglobin A1c was 5.5  Blood cultures on February 8 and February 9 are negative to date    Cardiac monitor results reviewed over the past 24 hrs: Normal sinus rhythm, no dysrhythmias    Recent Labs   Lab 02/10/23  1340 02/10/23  0521 02/09/23  2041 02/09/23  0749 02/09/23  0536 02/08/23  1659 02/08/23  1158 02/08/23  0537 02/08/23  0532 02/07/23  2254 02/07/23  1753   WBC  --  19.6*  --   --  25.0*  --   --  26.2*  --   --  28.0*   HGB  --  9.7*  --   --  9.5*  --  9.6* 10.3*  --   --  12.3   MCV  --   --   --   --   --   --   --  93  --   --  91   PLT  --  193  --   --   --   --   --  162  --   --  208   INR  --   --   --   --   --   --   --   --   --   --  1.09   NA  --  137  --   --  134*  --   --   --  134*  --  135*   POTASSIUM  --  3.5  --   --  3.6  --   --   --  4.1  4.1   < > 2.9*   CHLORIDE  --  102  --   --  99  --   --   --  98  --  91*   CO2  --  29  --   --  29  --   --   --  26  --  26   BUN  --  11.4  --   --  15.4  --   --   --  22.1  --  21.8   CR  --  0.91  --   --  0.94  --   --   --  1.24*  --  1.33*   ANIONGAP  --  6*  --   --  6*  --   --   --  10  --  18*   FADY  --  8.5*  --   --  8.4*  --   --   --  8.7*  --  9.5   * 109* 175*   < > 136*   < >  --   --  123*  --  99   ALBUMIN  --   --   --   --  2.5*  --   --   --  2.7*  --  3.5   PROTTOTAL  --   --   --   --  5.4*  --   --    --  5.8*  --  7.1   BILITOTAL  --   --   --   --  0.3  --   --   --  0.5  --  0.7   ALKPHOS  --   --   --   --  236*  --   --   --  135*  --  156*   ALT  --   --   --   --  52*  --   --   --  66*  --  88*   AST  --   --   --   --  40*  --   --   --  54*  --  63*    < > = values in this interval not displayed.

## 2023-02-11 LAB
ALBUMIN SERPL BCG-MCNC: 2.4 G/DL (ref 3.5–5.2)
ALP SERPL-CCNC: 299 U/L (ref 35–104)
ALT SERPL W P-5'-P-CCNC: 34 U/L (ref 10–35)
ANION GAP SERPL CALCULATED.3IONS-SCNC: 7 MMOL/L (ref 7–15)
AST SERPL W P-5'-P-CCNC: 23 U/L (ref 10–35)
BILIRUB SERPL-MCNC: 0.2 MG/DL
BUN SERPL-MCNC: 9.7 MG/DL (ref 8–23)
CALCIUM SERPL-MCNC: 8.5 MG/DL (ref 8.8–10.2)
CHLORIDE SERPL-SCNC: 103 MMOL/L (ref 98–107)
CREAT SERPL-MCNC: 0.79 MG/DL (ref 0.51–0.95)
CRP SERPL-MCNC: 219.16 MG/L
DEPRECATED HCO3 PLAS-SCNC: 30 MMOL/L (ref 22–29)
GFR SERPL CREATININE-BSD FRML MDRD: 81 ML/MIN/1.73M2
GLUCOSE SERPL-MCNC: 98 MG/DL (ref 70–99)
HGB BLD-MCNC: 9.9 G/DL (ref 11.7–15.7)
MAGNESIUM SERPL-MCNC: 2.2 MG/DL (ref 1.7–2.3)
POTASSIUM SERPL-SCNC: 3.5 MMOL/L (ref 3.4–5.3)
PROT SERPL-MCNC: 5.7 G/DL (ref 6.4–8.3)
SODIUM SERPL-SCNC: 140 MMOL/L (ref 136–145)
WBC # BLD AUTO: 19.5 10E3/UL (ref 4–11)

## 2023-02-11 PROCEDURE — 120N000001 HC R&B MED SURG/OB

## 2023-02-11 PROCEDURE — 80074 ACUTE HEPATITIS PANEL: CPT | Performed by: PEDIATRICS

## 2023-02-11 PROCEDURE — 250N000011 HC RX IP 250 OP 636: Performed by: PEDIATRICS

## 2023-02-11 PROCEDURE — 250N000013 HC RX MED GY IP 250 OP 250 PS 637: Performed by: PEDIATRICS

## 2023-02-11 PROCEDURE — 80053 COMPREHEN METABOLIC PANEL: CPT | Performed by: PEDIATRICS

## 2023-02-11 PROCEDURE — 86140 C-REACTIVE PROTEIN: CPT | Performed by: PEDIATRICS

## 2023-02-11 PROCEDURE — 99232 SBSQ HOSP IP/OBS MODERATE 35: CPT | Performed by: PEDIATRICS

## 2023-02-11 PROCEDURE — 85018 HEMOGLOBIN: CPT | Performed by: PEDIATRICS

## 2023-02-11 PROCEDURE — 85048 AUTOMATED LEUKOCYTE COUNT: CPT | Performed by: PEDIATRICS

## 2023-02-11 PROCEDURE — 83735 ASSAY OF MAGNESIUM: CPT | Performed by: PEDIATRICS

## 2023-02-11 RX ORDER — CIPROFLOXACIN 500 MG/1
500 TABLET, FILM COATED ORAL 2 TIMES DAILY
Status: DISCONTINUED | OUTPATIENT
Start: 2023-02-12 | End: 2023-02-12 | Stop reason: HOSPADM

## 2023-02-11 RX ADMIN — ENOXAPARIN SODIUM 40 MG: 40 INJECTION SUBCUTANEOUS at 12:11

## 2023-02-11 RX ADMIN — CEFTRIAXONE SODIUM 2 G: 2 INJECTION, POWDER, FOR SOLUTION INTRAMUSCULAR; INTRAVENOUS at 06:56

## 2023-02-11 RX ADMIN — LEVOTHYROXINE SODIUM 75 MCG: 75 TABLET ORAL at 09:23

## 2023-02-11 RX ADMIN — ATORVASTATIN CALCIUM 20 MG: 10 TABLET, FILM COATED ORAL at 09:23

## 2023-02-11 RX ADMIN — ACETAMINOPHEN 975 MG: 325 TABLET ORAL at 03:32

## 2023-02-11 RX ADMIN — ASPIRIN 325 MG: 325 TABLET ORAL at 09:23

## 2023-02-11 ASSESSMENT — ACTIVITIES OF DAILY LIVING (ADL)
ADLS_ACUITY_SCORE: 27
ADLS_ACUITY_SCORE: 26
ADLS_ACUITY_SCORE: 26
ADLS_ACUITY_SCORE: 27
ADLS_ACUITY_SCORE: 26
ADLS_ACUITY_SCORE: 27
ADLS_ACUITY_SCORE: 26
ADLS_ACUITY_SCORE: 27

## 2023-02-11 NOTE — PROVIDER NOTIFICATION
Read - 9:22 pm  249: pt is complaining of shortness of breath when lying in bed and upon exertion, RR: 24cpm, SPO2 at 92-96% on 2L/min via Oxymask, afebrile, BP: 118/55, 76bpm. Lung sound: fine crackles on left lower posterior lobe. She did received a one time dose of Lasix IV today at 17:14.      LB  Barbi Membreno - 9:25 pm  can we get an order for an albuterol inhaler? she has duo-neb ordered prn for wheezing.      BN  Joshua Jasmine - 9:29 pm  Just ordered, ty

## 2023-02-11 NOTE — PLAN OF CARE
Goal Outcome Evaluation:      Plan of Care Reviewed With: patient    Overall Patient Progress: improvingOverall Patient Progress: improving    Outcome Evaluation: Weaned to RA. Had shower SBA. VSS and afebrile. Ambulated to hallway. PICC line removed as ordered.

## 2023-02-11 NOTE — CONSULTS
Care Management Note:     Care Management team received referral due to elevated  risk score.       Per IDT rounds and EMR review, it has been determined that pt will discharge to home. Pt and daughter declined need for in-home services or additional supports.     Daughter will be available to assist with transportation tomorrow for discharge.     CM to complete handoff to clinic care coordination       CHRISTIANO Tolentino

## 2023-02-11 NOTE — PLAN OF CARE
"Goal Outcome Evaluation:      Plan of Care Reviewed With: patient    Overall Patient Progress: improvingOverall Patient Progress: improving    Outcome Evaluation: Patient is A&O x 4. PICC line patent. On a 1L/min of O2 support via Oxygen mask, SPO2 91-93%. Complained of SOB around 21:20, lung sound: fine crackles on LLL. MD updated and PRN inhaler given and was somewhat effective.  Ambulating to the bathroom, SBA, continent with urination. PRN Tylenol given x 2 this shift due to neck pain related to bed positioning as pt is a side sleeper. CRP result is trending down.    /56 (BP Location: Left arm)   Pulse 82   Temp 98.4  F (36.9  C) (Oral)   Resp 20   Ht 1.651 m (5' 5\")   Wt 87.4 kg (192 lb 11.2 oz)   SpO2 93%   BMI 32.07 kg/m        "

## 2023-02-11 NOTE — PROGRESS NOTES
[facility]    Medicine Progress Note - Hospitalist Service    Date of Admission:  2/7/2023    Assessment & Plan   67-year-old woman with hypertension, hypothyroidism, and remote history of endometrial cancer presented with confusion and was admitted due to concerns for urosepsis, encephalopathy, gastroenteritis, and electrolyte disturbances.  She subsequently worsened with concern for potentially life-threatening septic shock due to gram-negative bacteremia and UTI and was transferred to ICU on 2/8.  She now appears to be stabilizing.    Septic shock due to E. coli UTI and bacteremia with encephalopathy, acute kidney injury, acute hypoxic respiratory failure, and demand myocardial ischemia  SIRS criteria present on admission along with signs of organ dysfunction including encephalopathy, acute kidney injury, and demand myocardial ischemia concerning for severe sepsis.  Had persistent hypotension despite aggressive fluid resuscitation concerning for septic shock and required vasopressors for several hours overnight 2/8-2/9.  Initial blood and urine cultures grew E. coli.  Repeat blood cultures after starting antibiotics are negative so far.  Has not had persistent focal neurologic symptoms or confusion and no signs of stroke on MRI.  Clinically improving with resolving encephalopathy, acute kidney injury and respiratory failure.  -Continue ceftriaxone, day 5 antibiotics today   -may remove PICC line today  -Continue to advance diet and activity as tolerated  -Continue to monitor renal function closely    Acute hypoxic respiratory failure  Noncardiogenic acute pulmonary edema due to sepsis  Mild pulmonary hypertension  Worsening dyspnea with severe hypoxia requiring oxygen supplementation concerning for acute hypoxic respiratory failure.  Radiographic findings are suspicious for atelectasis at the left base but also interstitial pulmonary edema most likely due to noncardiogenic  pulmonary edema from sepsis.  Although troponin and BNP were mildly elevated, she is not known to have underlying heart disease and echo results were reassuring, so neither acute MI nor acute CHF are suspected.  Incidental note was made of mild pulmonary hypertension on echocardiogram but it is unclear whether this is acute or chronic.  Hypoxia responded to low-flow oxygen supplementation and she has now weaned off of need for oxygen supplementation since this morning.  -Wean oxygen supplementation for saturations 90% or higher  -Anticipate outpatient follow-up with PCP regarding pulmonary hypertension    Diarrhea, nausea, and vomiting  Presented with nausea, vomiting, and diarrhea probably due to sepsis and exacerbated by hypotension.  Not having abdominal pain.  No intestinal infection identified.  GI symptoms are starting to improve.  -Advance diet as tolerated  -Use antiemetics and/or antidiarrheal medication as needed    Hypokalemia  Hypomagnesemia  Presented with electrolyte disturbances including hypokalemia and hypomagnesemia likely exacerbated by diarrhea and vomiting.  Electrolytes have improved after supplementation.  -Continue to monitor electrolytes  -Continue magnesium and/or potassium supplementation by protocol as indicated    Anemia, unspecified type  Hemoglobin has decreased almost 3 g during hospitalization.  Active bleeding not apparent and hemolysis not suspected based on lab results.  Anemia due to hemodilution and/or sepsis is possible.  Anemia has stabilized over the last 48 hours.  -Follow hemoglobin    Abnormal liver function tests  Hypoalbuminemia  Presented with abnormal liver function tests and has had low serum albumin.  Transaminases have normalized and albumin has been stable.  Radiographic ultrasound findings were reportedly suspicious for hepatitis per interpreting radiologist.  However, infectious hepatitis has not been strongly suspected clinically.  Nonalcoholic steatohepatitis  seems more likely.  -Monitor as indicated  -Follow-up results of hepatitis viral serologies given the combination of abnormal radiographic findings, abnormal liver function test, and GI symptoms    Hypertension  Hyperlipidemia  Chronic normally taking hydrochlorothiazide, statin, and aspirin.  Blood pressure continues to be lower than her normal baseline in context of septic shock.  -Holding chronic hydrochlorothiazide  -Continue chronic statin and aspirin    Hypothyroidism  Chronically treated with levothyroxine.  Normal TSH and free T4 during hospitalization.  -Continuing chronic dose of levothyroxine    Obesity  Present at admission, chronic and stable.  Patient has made lifestyle changes to try to lose weight.  -No acute intervention, encouraged ongoing lifestyle changes    History of endometrial cancer  Remote history, not thought to be active at this time.  -Clinical monitoring       Diet: Combination Diet Regular Diet Adult    DVT Prophylaxis: Enoxaparin (Lovenox) SQ  Dueñas Catheter: Not present  Lines:   PICC 02/08/23 Double Lumen Right Basilic-Site Assessment: WDL      Cardiac Monitoring: None  Code Status: No CPR- Do NOT Intubate      Clinically Significant Risk Factors                        Disposition Plan      Expected Discharge Date: 02/12/2023                  Blade Guevara MD  Hospitalist Service  [facility]  Securely message with [employer] (more info)  Text page via [facility] Paging/Directory   ______________________________________________________________________    Interval History   There were no significant overnight events.  Patient says she is feeling better today.  Appetite is starting to improve although is not yet normal.  She feels stronger and is starting to be more active.  She has been able to ambulate independently.  She is less short of breath and weaned off of oxygen supplementation this morning.  She has been afebrile and hemodynamically stable.  She is  voiding spontaneously.  Diarrhea seems to be improving.    Physical Exam   Vital Signs: Temp: 97.7  F (36.5  C) Temp src: Oral BP: 130/66 Pulse: 82   Resp: 18 SpO2: 95 % O2 Device: None (Room air)   Patient Vitals for the past 24 hrs:   BP Temp Temp src Pulse Resp SpO2   02/11/23 1541 130/66 97.7  F (36.5  C) Oral 82 18 95 %   02/11/23 1057 132/63 97.6  F (36.4  C) Oral 73 18 92 %   02/11/23 0847 -- -- -- -- -- 95 %   02/11/23 0644 (!) 116/38 97.6  F (36.4  C) Oral 74 18 94 %   02/11/23 0332 -- -- -- -- -- 93 %   02/11/23 0321 -- -- -- -- -- 96 %   02/11/23 0251 123/56 98.4  F (36.9  C) Oral 82 20 94 %   02/10/23 2330 123/57 97  F (36.1  C) Oral 82 18 94 %   02/10/23 2329 -- -- -- -- -- 96 %   02/10/23 2325 -- -- -- -- -- 97 %   02/10/23 2114 118/55 97.5  F (36.4  C) Oral 76 24 --   02/10/23 2111 -- -- -- -- -- 92 %   02/10/23 2105 -- -- -- -- -- 94 %   02/10/23 2045 112/53 97.6  F (36.4  C) Oral 79 24 94 %   02/10/23 1948 133/55 -- -- -- -- --   02/10/23 1945 -- -- -- -- -- 96 %   02/10/23 1904 114/52 96.9  F (36.1  C) Oral 80 24 95 %     Weight: 189 lbs 11.2 oz     Vitals:    02/07/23 1739 02/07/23 2306 02/10/23 0600 02/11/23 1555   Weight: 80.4 kg (177 lb 4.8 oz) 81.5 kg (179 lb 11.2 oz) 87.4 kg (192 lb 11.2 oz) 86 kg (189 lb 11.2 oz)          Intake/Output Summary (Last 24 hours) at 2/11/2023 1546  Last data filed at 2/11/2023 1542  Gross per 24 hour   Intake 963 ml   Output 1150 ml   Net -187 ml     General Appearance: No acute distress sitting up in a chair  Respiratory: Normal respiratory effort, clear lungs  Cardiovascular: Regular rate and rhythm, normal capillary refill     Medical Decision Making           Data     I have personally reviewed the following data over the past 24 hrs:    19.5 (H)  \   9.9 (L)   / N/A     140 103 9.7 /  98   3.5 30 (H) 0.79 \       ALT: 34 AST: 23 AP: 299 (H) TBILI: 0.2   ALB: 2.4 (L) TOT PROTEIN: 5.7 (L) LIPASE: N/A       TSH: N/A T4: N/A A1C: N/A       Procal: N/A CRP:  219.16 (H) Lactic Acid: N/A          TSH 2.9 with free T4.93 yesterday both of which were normal  Blood cultures from February 8 and February 9 have no growth to date  Hepatitis panel is pending    Recent Labs   Lab 02/11/23  0559 02/10/23  1340 02/10/23  0521 02/09/23  0749 02/09/23  0536 02/08/23  1158 02/08/23  0537 02/07/23  2254 02/07/23  1753   WBC 19.5*  --  19.6*  --  25.0*  --  26.2*  --  28.0*   HGB 9.9*  --  9.7*  --  9.5*   < > 10.3*  --  12.3   MCV  --   --   --   --   --   --  93  --  91   PLT  --   --  193  --   --   --  162  --  208   INR  --   --   --   --   --   --   --   --  1.09     --  137  --  134*  --   --    < > 135*   POTASSIUM 3.5  --  3.5  --  3.6  --   --    < > 2.9*   CHLORIDE 103  --  102  --  99  --   --    < > 91*   CO2 30*  --  29  --  29  --   --    < > 26   BUN 9.7  --  11.4  --  15.4  --   --    < > 21.8   CR 0.79  --  0.91  --  0.94  --   --    < > 1.33*   ANIONGAP 7  --  6*  --  6*  --   --    < > 18*   FADY 8.5*  --  8.5*  --  8.4*  --   --    < > 9.5   GLC 98 142* 109*   < > 136*   < >  --    < > 99   ALBUMIN 2.4*  --   --   --  2.5*  --   --    < > 3.5   PROTTOTAL 5.7*  --   --   --  5.4*  --   --    < > 7.1   BILITOTAL 0.2  --   --   --  0.3  --   --    < > 0.7   ALKPHOS 299*  --   --   --  236*  --   --    < > 156*   ALT 34  --   --   --  52*  --   --    < > 88*   AST 23  --   --   --  40*  --   --    < > 63*    < > = values in this interval not displayed.

## 2023-02-12 VITALS
BODY MASS INDEX: 31.61 KG/M2 | HEIGHT: 65 IN | DIASTOLIC BLOOD PRESSURE: 69 MMHG | OXYGEN SATURATION: 96 % | TEMPERATURE: 98.1 F | RESPIRATION RATE: 20 BRPM | HEART RATE: 79 BPM | WEIGHT: 189.7 LBS | SYSTOLIC BLOOD PRESSURE: 135 MMHG

## 2023-02-12 PROBLEM — J81.0 ACUTE PULMONARY EDEMA (H): Status: ACTIVE | Noted: 2023-02-12

## 2023-02-12 LAB
CRP SERPL-MCNC: 130.07 MG/L
HGB BLD-MCNC: 10.4 G/DL (ref 11.7–15.7)
MAGNESIUM SERPL-MCNC: 2 MG/DL (ref 1.7–2.3)
POTASSIUM SERPL-SCNC: 3.6 MMOL/L (ref 3.4–5.3)

## 2023-02-12 PROCEDURE — 83735 ASSAY OF MAGNESIUM: CPT | Performed by: PEDIATRICS

## 2023-02-12 PROCEDURE — 250N000013 HC RX MED GY IP 250 OP 250 PS 637: Performed by: PEDIATRICS

## 2023-02-12 PROCEDURE — 84132 ASSAY OF SERUM POTASSIUM: CPT | Performed by: PEDIATRICS

## 2023-02-12 PROCEDURE — 86140 C-REACTIVE PROTEIN: CPT | Performed by: PEDIATRICS

## 2023-02-12 PROCEDURE — 85018 HEMOGLOBIN: CPT | Performed by: PEDIATRICS

## 2023-02-12 PROCEDURE — 36415 COLL VENOUS BLD VENIPUNCTURE: CPT | Performed by: PEDIATRICS

## 2023-02-12 PROCEDURE — 250N000011 HC RX IP 250 OP 636: Performed by: PEDIATRICS

## 2023-02-12 PROCEDURE — 99239 HOSP IP/OBS DSCHRG MGMT >30: CPT | Performed by: PEDIATRICS

## 2023-02-12 RX ORDER — CIPROFLOXACIN 500 MG/1
500 TABLET, FILM COATED ORAL 2 TIMES DAILY
Qty: 14 TABLET | Refills: 0 | Status: SHIPPED | OUTPATIENT
Start: 2023-02-12 | End: 2023-02-19

## 2023-02-12 RX ORDER — HYDROCHLOROTHIAZIDE 12.5 MG/1
CAPSULE ORAL
COMMUNITY
Start: 2023-02-12

## 2023-02-12 RX ADMIN — ASPIRIN 325 MG: 325 TABLET ORAL at 10:04

## 2023-02-12 RX ADMIN — ATORVASTATIN CALCIUM 20 MG: 10 TABLET, FILM COATED ORAL at 10:04

## 2023-02-12 RX ADMIN — LEVOTHYROXINE SODIUM 75 MCG: 75 TABLET ORAL at 10:04

## 2023-02-12 RX ADMIN — CIPROFLOXACIN HYDROCHLORIDE 500 MG: 500 TABLET, FILM COATED ORAL at 10:04

## 2023-02-12 RX ADMIN — ENOXAPARIN SODIUM 40 MG: 40 INJECTION SUBCUTANEOUS at 10:07

## 2023-02-12 ASSESSMENT — ACTIVITIES OF DAILY LIVING (ADL)
ADLS_ACUITY_SCORE: 27

## 2023-02-12 NOTE — PROGRESS NOTES
S-(situation): Patient discharged to home via wheelchair with daughter    B-(background): Admitted d/t septic shock    A-(assessment): A and O. On RA. VSS and afebrile. SBA.    R-(recommendations): Discharge instructions reviewed with patient and daughter. Listed belongings gathered and returned to patient.          Discharge Nursing Criteria:     Care Plan and Patient education resolved: Yes    New Medications- pt has been educated about purpose and side effects: Yes    Vaccines  Influenza status verified at discharge:  No    Intentionally Retained Items No    MISC  Prescriptions if needed, hard copies sent with patient  Yes  Home medications returned to patient: NA  Medication Bin checked and emptied on discharge Yes  Patient reports post-discharge pain management plan is effective: Yes

## 2023-02-12 NOTE — PLAN OF CARE
"Goal Outcome Evaluation:      Plan of Care Reviewed With: patient    Overall Patient Progress: improvingOverall Patient Progress: improving    Outcome Evaluation: 4hr shiftt: Pt is A&O. On room air. Pleasant and looking forward to discharge. No complained of headache this shift.    /47 (BP Location: Left arm)   Pulse 79   Temp 99.8  F (37.7  C) (Oral)   Resp 18   Ht 1.651 m (5' 5\")   Wt 86 kg (189 lb 11.2 oz)   SpO2 98%   BMI 31.57 kg/m      "

## 2023-02-12 NOTE — DISCHARGE SUMMARY
[facility]  Hospitalist Discharge Summary      Date of Admission:  2/7/2023  Date of Discharge:  2/12/2023  Discharging Provider: Blade Guevara MD  Discharge Service: Hospitalist Service    Discharge Diagnoses   Principal Problem:    Septic shock (H)  Active Problems:    E. coli UTI    E coli bacteremia    Demand ischemia (H)    Anemia, unspecified type    Acute kidney injury (H)    Encephalopathy    Hypopotassemia    Abnormal LFTs    Hypomagnesemia    Acute respiratory failure with hypoxia (H)    Acute pulmonary edema (H)    Diarrhea    History of endometrial cancer    Hypoalbuminemia    Essential hypertension    Acquired hypothyroidism    Follow-ups Needed After Discharge   Follow-up Appointments     Follow-up and recommended labs and tests       Follow up with primary care provider, [facility], within 7 days to evaluate medication change, for hospital follow-   up, and regarding new diagnosis.  The following labs/tests are   recommended: CRP, potassium.  Consider repeat Echo in 2-3 months after   recovery from acute illness due to pulmonary hypertension noted on Echo   during hospitalization.           Unresulted Labs Ordered in the Past 30 Days of this Admission     Date and Time Order Name Status Description    2/11/2023 12:01 AM Acute hepatitis panel In process     2/9/2023 12:02 AM Blood Culture Peripheral Blood Preliminary     2/9/2023 12:02 AM Blood Culture Hand, Right Preliminary     2/8/2023  3:53 PM Blood Culture Wrist, Left Preliminary     2/8/2023  3:53 PM Blood Culture Hand, Right Preliminary       These results will be followed up by PCP    Discharge Disposition   Discharged to home  Condition at discharge: Stable    Hospital Course   67-year-old woman with hypertension, hypothyroidism, and remote history of endometrial cancer presented with confusion and was admitted due to concerns for urosepsis, encephalopathy, gastroenteritis, and  electrolyte disturbances.  She subsequently worsened with concern for potentially life-threatening septic shock due to gram-negative bacteremia and UTI and was transferred to ICU.    Septic shock due to E. coli UTI and bacteremia with encephalopathy, acute kidney injury, acute hypoxic respiratory failure, and demand myocardial ischemia  SIRS criteria present on admission along with signs of organ dysfunction including encephalopathy, acute kidney injury, and demand myocardial ischemia concerning for severe sepsis.  Troponin was mildly elevated and she may have had subtle ST segment depression on EKG although also appeared to have LVH.  She did not have angina, and subsequent echocardiogram did not demonstrate any regional wall motion abnormalities.  She had persistent hypotension despite aggressive fluid resuscitation concerning for septic shock and required vasopressors in ICU for several hours.  Initial blood and urine cultures grew E. coli.  Repeat blood cultures after starting antibiotics were negative.  Mentation rapidly improved and she did not have persistent focal neurologic symptoms.  There were no signs of stroke on MRI.  Clinically improved with resolving encephalopathy, acute kidney injury and respiratory failure by discharge.  Renal function recovered to her normal baseline.  She was treated with 5 days IV ceftriaxone during hospitalization and advised to complete an additional week of oral ciprofloxacin after discharge.  Close outpatient follow-up with her PCP was recommended.    Acute hypoxic respiratory failure  Noncardiogenic acute pulmonary edema due to sepsis  Mild pulmonary hypertension  She developed worsening dyspnea with severe hypoxia requiring oxygen supplementation concerning for acute hypoxic respiratory failure.  Radiographic findings were suspicious for atelectasis at the left base but also interstitial pulmonary edema most likely due to noncardiogenic pulmonary edema from sepsis.   Although troponin and BNP were mildly elevated, she was not known to have underlying heart disease and echo results were reassuring, so neither acute MI nor acute CHF are suspected.  Incidental note was made of mild pulmonary hypertension on echocardiogram but it is unclear whether this is acute or chronic.  Hypoxia responded to low-flow oxygen supplementation and she weaned off of need for oxygen supplementation.  Outpatient follow-up with PCP regarding new diagnosis of pulmonary hypertension was recommended.    Diarrhea, nausea, and vomiting  Presented with nausea, vomiting, and diarrhea probably due to sepsis and exacerbated by hypotension.  She did not have abdominal pain.  Testing of stool for enteric infection was negative.  GI symptoms were starting to improve by discharge and she was able to tolerate good oral intake.    Hypokalemia  Hypomagnesemia  Presented with electrolyte disturbances including hypokalemia and hypomagnesemia likely exacerbated by diarrhea and vomiting.  Electrolytes improved after supplementation and as oral intake improved and diarrhea subsided.    Anemia, unspecified type  Hemoglobin decreased almost 3 g during hospitalization and trended upward after stopping IV fluids although did not normalize.  Active bleeding was not apparent and hemolysis was not suspected.  Anemia due to hemodilution and sepsis appeared likely.      Abnormal liver function tests  Hypoalbuminemia  Presented with abnormal liver function tests and had low serum albumin.  Transaminases normalized and albumin stabilized.  Radiographic ultrasound findings were reportedly suspicious for hepatitis per interpreting radiologist.  Infectious hepatitis was not been strongly suspected clinically and test results for acute hepatitis was pending at the time of discharge.  Nonalcoholic steatohepatitis seemed more likely.    Hypertension  Hyperlipidemia  Chronic normally has been taking hydrochlorothiazide, statin, and aspirin.   Blood pressure remained normotensive and lower than her normal baseline after this episode of septic shock even as she otherwise recovered.  She was advised to avoid restarting hydrochlorothiazide after discharge and monitor her blood pressures until follow-up with her PCP to recheck blood pressure.  Chronic aspirin and statin therapies were continued.    Hypothyroidism  Chronically treated with levothyroxine.  Normal TSH and free T4 during hospitalization.  Chronic dose of levothyroxine was continued.    Obesity  Obesity was chronic and stable.  Patient reported that she had been making lifestyle changes to try to lose weight.  She was encouraged to continue her lifestyle changes after discharge.    History of endometrial cancer  Remote history endometrial cancer, not thought to be active during this hospital stay.      Consultations This Hospital Stay   VASCULAR ACCESS ADULT IP CONSULT  CARE MANAGEMENT / SOCIAL WORK IP CONSULT    Code Status   No CPR- Do NOT Intubate    Time Spent on this Encounter   I, Blade Guevara MD, personally saw the patient today and spent greater than 30 minutes discharging this patient.       Blade Guevara MD  [facility] 2A MEDICAL SURGICAL  [address] [place] [address]  Phone: [phone]  ______________________________________________________________________    Physical Exam   Vital Signs: Temp: 98.1  F (36.7  C) Temp src: Oral BP: 135/69 Pulse: 79   Resp: 20 SpO2: 96 % O2 Device: None (Room air)    Weight: 189 lbs 11.2 oz  General Appearance: Pale elderly woman, no acute distress sitting up in bed  Neuro: Alert and maintains wakefulness and attention, answers questions appropriately, no confusion or focal deficits evident       Primary Care Physician   [facility] [place]    Discharge Orders      Primary Care - Care Coordination Referral      Reason for your hospital stay    Hospitalized due to severe infection (E coli septic  shock) and improved     Follow-up and recommended labs and tests     Follow up with primary care provider, [facility], within 7 days to evaluate medication change, for hospital follow- up, and regarding new diagnosis.  The following labs/tests are recommended: CRP, potassium.  Consider repeat Echo in 2-3 months after recovery from acute illness due to pulmonary hypertension noted on Echo during hospitalization.     Activity    Your activity upon discharge: activity as tolerated     Diet    Follow this diet upon discharge: Orders Placed This Encounter      Combination Diet Regular Diet Adult       Significant Results and Procedures   Most Recent 3 CBC's:Recent Labs   Lab Test 02/12/23  0538 02/11/23  0559 02/10/23  0521 02/09/23  0536 02/08/23  1158 02/08/23  0537 02/07/23  1753   WBC  --  19.5* 19.6* 25.0*  --  26.2* 28.0*   HGB 10.4* 9.9* 9.7* 9.5*   < > 10.3* 12.3   MCV  --   --   --   --   --  93 91   PLT  --   --  193  --   --  162 208    < > = values in this interval not displayed.     Most Recent 3 BMP's:Recent Labs   Lab Test 02/12/23  0538 02/11/23  0559 02/10/23  1340 02/10/23  0521 02/09/23  0749 02/09/23  0536   NA  --  140  --  137  --  134*   POTASSIUM 3.6 3.5  --  3.5  --  3.6   CHLORIDE  --  103  --  102  --  99   CO2  --  30*  --  29  --  29   BUN  --  9.7  --  11.4  --  15.4   CR  --  0.79  --  0.91  --  0.94   ANIONGAP  --  7  --  6*  --  6*   FADY  --  8.5*  --  8.5*  --  8.4*   GLC  --  98 142* 109*   < > 136*    < > = values in this interval not displayed.     Most Recent 2 LFT's:Recent Labs   Lab Test 02/11/23 0559 02/09/23 0536   AST 23 40*   ALT 34 52*   ALKPHOS 299* 236*   BILITOTAL 0.2 0.3     Most Recent 3 BNP's:Recent Labs   Lab Test 02/09/23  0536   NTBNPI 3,810*     7-Day Micro Results     Collected Updated Procedure Result Status      02/09/2023 0536 02/12/2023 1002 Blood Culture Hand, Right [[id number]]   Blood from Hand, Right    Preliminary result  Component Value   Culture No growth after 3 days  [P]                02/09/2023 0526 02/12/2023 1002 Blood Culture Peripheral Blood [[id number]]   Peripheral Blood    Preliminary result Component Value   Culture No growth after 3 days  [P]                02/08/2023 1630 02/12/2023 0103 Blood Culture Hand, Right [[id number]]   Blood from Hand, Right    Preliminary result Component Value   Culture No growth after 3 days  [P]                02/08/2023 1630 02/12/2023 0103 Blood Culture Wrist, Left [[id number]]    Blood from Wrist, Left    Preliminary result Component Value   Culture No growth after 3 days  [P]                02/08/2023 0551 02/08/2023 1448 Enteric Bacteria and Virus Panel by LACIE Stool [[id number]]    Stool from Per Rectum    Final result Component Value   Campylobacter group Not Detected   Salmonella species Not Detected   Shigella species Not Detected   Vibrio group Not Detected   Rotavirus Not Detected   Shiga toxin 1 gene Not Detected   Shiga toxin 2 gene Not Detected   Norovirus I and II Not Detected   Yersinia enterocolitica Not Detected            02/07/2023 1929 02/09/2023 2057 Urine Culture [[id number]]    (Abnormal)   Urine, NOS    Final result Component Value   Culture >100,000 CFU/mL Escherichia coli    <10,000 CFU/mL Mixture of urogenital jag        Susceptibility      Escherichia coli      HAROLDO      Ampicillin 4 ug/mL Susceptible      Ampicillin/ Sulbactam <=2 ug/mL Susceptible      Cefazolin <=4 ug/mL Susceptible  [1]       Cefepime <=1 ug/mL Susceptible      Cefoxitin <=4 ug/mL Susceptible      Ceftazidime <=1 ug/mL Susceptible      Ceftriaxone <=1 ug/mL Susceptible      Ciprofloxacin <=0.25 ug/mL Susceptible      Gentamicin <=1 ug/mL Susceptible      Levofloxacin <=0.12 ug/mL Susceptible      Nitrofurantoin <=16 ug/mL Susceptible      Piperacillin/Tazobactam <=4 ug/mL Susceptible      Tobramycin <=1 ug/mL Susceptible      Trimethoprim/Sulfamethoxazole <=1/19 ug/mL Susceptible                    [1]  Cefazolin HAROLDO breakpoints are for the treatment of uncomplicated urinary tract infections. For the treatment of systemic infections, please contact the laboratory for additional testing.                 02/07/2023 1915 02/10/2023 0726 Blood Culture Arm, Left [[id number]]   (Abnormal)   Blood from Arm, Left    Final result Component Value   Culture Positive on the 1st day of incubation    Escherichia coli    1 of 2 bottlesSusceptibilities done on previous cultures               02/07/2023 1852 02/07/2023 1939 Symptomatic Influenza A/B & SARS-CoV2 (COVID-19) Virus PCR Multiplex Nose [[id number]]    Swab from Nose    Final result Component Value   Influenza A PCR Negative   Influenza B PCR Negative   RSV PCR Negative   SARS CoV2 PCR Negative   NEGATIVE: SARS-CoV-2 (COVID-19) RNA not detected, presumed negative.            02/07/2023 1851 02/10/2023 0722 Blood Culture Peripheral Blood [[id number]]    (Abnormal)   Peripheral Blood    Final result Component Value   Culture Positive on the 1st day of incubation    Escherichia coli    2 of 2 bottles        Susceptibility      Escherichia coli      HAROLDO      Ampicillin 4 ug/mL Susceptible      Ampicillin/ Sulbactam <=2 ug/mL Susceptible      Cefepime <=1 ug/mL Susceptible      Ceftazidime <=1 ug/mL Susceptible      Ceftriaxone <=1 ug/mL Susceptible      Ciprofloxacin <=0.25 ug/mL Susceptible      Gentamicin <=1 ug/mL Susceptible      Levofloxacin <=0.12 ug/mL Susceptible      Meropenem <=0.25 ug/mL Susceptible      Piperacillin/Tazobactam <=4 ug/mL Susceptible      Tobramycin <=1 ug/mL Susceptible      Trimethoprim/Sulfamethoxazole <=1/19 ug/mL Susceptible                           02/07/2023 1851 02/08/2023 1421 Verigene GN Panel [[id number]]    (Abnormal)   Peripheral Blood    Final result Component Value   Acinetobacter species Not Detected   Citrobacter species Not Detected   Enterobacter species Not Detected   Proteus species Not Detected    Escherichia coli Detected   Positive for Escherichia coli by Verigene multiplex nucleic acid test. Final identification and antimicrobial susceptibility testing will be verified by standard methods. Verigene test will not distinguish E. coli from Shigella species including Shigella dysenteriae, Shigella flexneri, Shigella boydii, and Shigella sonnei. Specimens containing Shigella species or E. coli will be reported as positive for E. coli.   Klebsiella pneumoniae Not Detected   Klebsiella oxytoca Not Detected   Pseudomonas aeruginosa Not Detected   CTX-M Not Detected   KPC Not Detected   NDM Not Detected   VIM Not Detected   IMP Not Detected   OXA Not Detected                Most Recent TSH and T4:Recent Labs   Lab Test 02/10/23  0521   TSH 2.87   T4 0.93     Most Recent Hemoglobin A1c:Recent Labs   Lab Test 02/10/23  0521   A1C 5.5     Most Recent 6 glucoses:Recent Labs   Lab Test 02/11/23  0559 02/10/23  1340 02/10/23  0521 02/09/23  2041 02/09/23  1613 02/09/23  0749   GLC 98 142* 109* 175* 121* 106*     Most Recent Urinalysis:Recent Labs   Lab Test 02/07/23  1929   COLOR Yellow   APPEARANCE Cloudy*   URINEGLC Negative   URINEBILI Negative   URINEKETONE 15*   SG 1.010   UBLD Large*   URINEPH 6.5   PROTEIN 100*   NITRITE Positive*   LEUKEST Moderate*   RBCU 12*   WBCU >182*     Most Recent ESR & CRP:Recent Labs   Lab Test 02/12/23  0538 02/09/23  0536 02/07/23  1753   SED  --   --  41*   CRPI 130.07*   < > 393.30*    < > = values in this interval not displayed.   ,   Results for orders placed or performed during the hospital encounter of 02/07/23   CT Head w/o Contrast    Narrative    EXAM: CT HEAD W/O CONTRAST, CTA HEAD NECK W CONTRAST  LOCATION: [facility]  DATE/TIME: 2/7/2023 5:52 PM    INDICATION: word finding difficulty, confusion  COMPARISON: None.  CONTRAST: 80mL, Isovue 370  TECHNIQUE: Head and neck CT angiogram with IV contrast. Noncontrast head CT followed by axial  helical CT images of the head and neck vessels obtained during the arterial phase of intravenous contrast administration. Axial 2D reconstructed images and   multiplanar 3D MIP reconstructed images of the head and neck vessels were performed by the technologist. Dose reduction techniques were used. All stenosis measurements made according to NASCET criteria unless otherwise specified.    FINDINGS:   NONCONTRAST HEAD CT:   INTRACRANIAL CONTENTS: No intracranial hemorrhage, extraaxial collection, or mass effect.  No CT evidence of acute infarct. Normal parenchymal attenuation. Normal ventricles and sulci.     VISUALIZED ORBITS/SINUSES/MASTOIDS: No intraorbital abnormality. No paranasal sinus mucosal disease. No middle ear or mastoid effusion.    BONES/SOFT TISSUES: No acute abnormality.    HEAD CTA:  ANTERIOR CIRCULATION: No stenosis/occlusion, aneurysm, or high flow vascular malformation. Standard [place] anatomy.    POSTERIOR CIRCULATION: No stenosis/occlusion, aneurysm, or high flow vascular malformation. Balanced vertebral arteries supply a normal basilar artery.     DURAL VENOUS SINUSES: Expected enhancement of the major dural venous sinuses.    NECK CTA:  RIGHT CAROTID: No measurable stenosis or dissection.    LEFT CAROTID: No measurable stenosis or dissection.    VERTEBRAL ARTERIES: No focal stenosis or dissection. Balanced vertebral arteries.    AORTIC ARCH: Classic aortic arch anatomy with no significant stenosis at the origin of the great vessels.    NONVASCULAR STRUCTURES: Unremarkable.       Impression    IMPRESSION:   HEAD CT:  1.  Normal head CT.    HEAD CTA:   1.  Normal CTA [place].    NECK CTA:  1.  Normal neck CTA.    Findings discussed with Dr. Jacobs at 12:00 PM on 02/07/2023   CTA Head Neck with Contrast    Narrative    EXAM: CT HEAD W/O CONTRAST, CTA HEAD NECK W CONTRAST  LOCATION: [facility]  DATE/TIME: 2/7/2023 5:52 PM    INDICATION: word  finding difficulty, confusion  COMPARISON: None.  CONTRAST: 80mL, Isovue 370  TECHNIQUE: Head and neck CT angiogram with IV contrast. Noncontrast head CT followed by axial helical CT images of the head and neck vessels obtained during the arterial phase of intravenous contrast administration. Axial 2D reconstructed images and   multiplanar 3D MIP reconstructed images of the head and neck vessels were performed by the technologist. Dose reduction techniques were used. All stenosis measurements made according to NASCET criteria unless otherwise specified.    FINDINGS:   NONCONTRAST HEAD CT:   INTRACRANIAL CONTENTS: No intracranial hemorrhage, extraaxial collection, or mass effect.  No CT evidence of acute infarct. Normal parenchymal attenuation. Normal ventricles and sulci.     VISUALIZED ORBITS/SINUSES/MASTOIDS: No intraorbital abnormality. No paranasal sinus mucosal disease. No middle ear or mastoid effusion.    BONES/SOFT TISSUES: No acute abnormality.    HEAD CTA:  ANTERIOR CIRCULATION: No stenosis/occlusion, aneurysm, or high flow vascular malformation. Standard [place] anatomy.    POSTERIOR CIRCULATION: No stenosis/occlusion, aneurysm, or high flow vascular malformation. Balanced vertebral arteries supply a normal basilar artery.     DURAL VENOUS SINUSES: Expected enhancement of the major dural venous sinuses.    NECK CTA:  RIGHT CAROTID: No measurable stenosis or dissection.    LEFT CAROTID: No measurable stenosis or dissection.    VERTEBRAL ARTERIES: No focal stenosis or dissection. Balanced vertebral arteries.    AORTIC ARCH: Classic aortic arch anatomy with no significant stenosis at the origin of the great vessels.    NONVASCULAR STRUCTURES: Unremarkable.       Impression    IMPRESSION:   HEAD CT:  1.  Normal head CT.    HEAD CTA:   1.  Normal CTA [place].    NECK CTA:  1.  Normal neck CTA.    Findings discussed with Dr. Jacobs at 12:00 PM on 02/07/2023   Abdomen US, limited (RUQ only)     Narrative    EXAM: US ABDOMEN LIMITED  LOCATION: [facility]  DATE/TIME: 2/7/2023 8:11 PM    INDICATION: elevated WBC and LFT, concern for GB disease.  COMPARISON: CT 04/12/2011, MRI 03/22/2011  TECHNIQUE: Limited abdominal ultrasound.    FINDINGS:    GALLBLADDER: Normal. No gallstones, wall thickening, or pericholecystic fluid. Negative sonographic Howard's sign.    BILE DUCTS: No biliary dilatation. The common duct measures 6 mm.    LIVER: Decreased parenchymal echogenicity with increased conspicuity of portal triads. Echogenic lesion in the posterior right hepatic lobe measuring up to 3.1 cm which likely corresponds to benign hepatic hemangioma seen on prior MRI, no specific   follow-up recommended.    RIGHT KIDNEY: No hydronephrosis.    PANCREAS: The visualized portions are normal.    No ascites.      Impression    IMPRESSION:  1.  Findings suggestive of hepatitis.  2.  No cholelithiasis or anuradha biliary obstruction.       XR Chest Port 1 View    Narrative    EXAM: XR CHEST PORT 1 VIEW  LOCATION: [facility]  DATE/TIME: 2/7/2023 8:11 PM    INDICATION: fever, weakness, borderline O2 sats  COMPARISON: None.      Impression    IMPRESSION: Negative chest. No focal infiltrates or consolidations. No pleural effusion or pneumothorax. Normal heart size and pulmonary vasculature. Old right lateral rib fracture.   MR Brain w/o & w Contrast    Narrative    MRI BRAIN WITHOUT AND WITH CONTRAST  2/9/2023 1:29 PM    HISTORY: Word-finding difficulty; evaluate CVA.     TECHNIQUE:  Multiplanar, multisequence MRI of the brain without and  with 7.5 mL Dotarem.    COMPARISON: Head CT 2/7/2022    FINDINGS:  Moderate motion artifact throughout the exam. No abnormal enhancement  or diffusion restriction is identified. Mild generalized parenchymal  volume loss with scattered areas of nonspecific white matter T2  hyperintense signal which likely represent mild chronic small  vessel  ischemic change. No marrow signal abnormalities are appreciated.      Impression    IMPRESSION:  1. Motion limited exam.  2. No evidence of acute ischemia or hemorrhage.    LUIS ADAMS MD         SYSTEM ID:  [id number]   XR Chest 1 View    Narrative    EXAM: XR CHEST 1 VIEW  LOCATION: [facility]  DATE/TIME: 2023 11:17 PM    INDICATION: RN placed PICC   verify tip placement  COMPARISON: 2023      Impression    IMPRESSION: Right PICC tip in upper right atrium. No pneumothorax or significant changes.   XR Chest Port 1 View    Narrative    EXAM: XR CHEST PORT 1 VIEW  LOCATION: [facility]  DATE/TIME: 2023 3:44 AM    INDICATION: eval PICC line placement  COMPARISON: None.      Impression    IMPRESSION: Right PICC tip in the mid SVC. Interstitial prominence. Mild left basilar atelectasis. Normal heart size. No pneumothorax.   Echocardiogram Complete     Value    LVEF  55-60%    Narrative    [id number]  [id number]  [id number]  [id number]^BETTY^JACLYN^BAUTISTA     [facility]  Echocardiography Laboratory  [address] [place] [place] [address]     Name: ALONZO LONDON  MRN: [id number]  : 1955  Study Date: 2023 01:35 PM  Age: 67 yrs  Gender: Female  Patient Location: [facility]  Reason For Study: CHF  History: HTN,Sepsis  Ordering Physician: JACLYN HERNÁNDEZ  Performed By: Vivi Vaughan     BSA: 1.9 m2  Height: 65 in  Weight: 179 lb  HR: 94  BP: 101/60 mmHg  ______________________________________________________________________________  Procedure  Complete Portable Echo Adult. Optison (NDC #8811-4707) given intravenously.  Technically difficult study.  ______________________________________________________________________________  Interpretation Summary     The visual ejection fraction is 55-60%.  There is septal bounce noted. There is non specific septal flattening with  inspiration. Increased ventricular  interdependence. No other echo features of  constriction obviously noted.  The right ventricle is normal in size and function.  There is mild to moderate (1-2+) tricuspid regurgitation.  Right ventricular systolic pressure is elevated, consistent with mild  pulmonary hypertension. The right ventricular systolic pressure is  approximated at 40mmHg plus the right atrial pressure.  Dilation of the inferior vena cava is present with normal respiratory  variation in diameter.  There is no pericardial effusion.     ______________________________________________________________________________  Left Ventricle  The left ventricle is normal in size. There is normal left ventricular wall  thickness. The visual ejection fraction is 55-60%. There is septal bounce  noted. There is non specific septal flattening with inspiration. Increased  ventricular interdependence. No other echo features of constriction obviously  noted.     Right Ventricle  The right ventricle is normal in size and function.     Atria  Normal left atrial size. Right atrial size is normal.     Mitral Valve  The mitral valve is normal in structure and function. There is mild (1+)  mitral regurgitation. There is no mitral valve stenosis.     Tricuspid Valve  The tricuspid valve is not well visualized, but is grossly normal. Right  ventricular systolic pressure is elevated, consistent with mild pulmonary  hypertension. There is mild to moderate (1-2+) tricuspid regurgitation. The  right ventricular systolic pressure is approximated at 40mmHg plus the right  atrial pressure.     Aortic Valve  The aortic valve is normal in structure and function. There is trace aortic  regurgitation. No hemodynamically significant valvular aortic stenosis.     Pulmonic Valve  The pulmonic valve is not well visualized.     Vessels  The aortic root is normal size. The aortic root is not well visualized.  Dilation of the inferior vena cava is present with normal  respiratory  variation in diameter. IVC diameter and respiratory changes fall into an  intermediate range suggesting an RA pressure of 8 mmHg.     Pericardium  There is no pericardial effusion.     Rhythm  Sinus rhythm was noted.  ______________________________________________________________________________  MMode/2D Measurements & Calculations     IVSd: 0.84 cm  LVIDd: 4.9 cm  LVIDs: 3.7 cm  LVPWd: 0.83 cm  FS: 25.1 %  LV mass(C)d: 138.1 grams  LV mass(C)dI: 73.2 grams/m2  Ao root diam: 3.0 cm  LA dimension: 3.9 cm  LA/Ao: 1.3  LA Volume (BP): 46.0 ml  LA Volume Index (BP): 24.3 ml/m2  RWT: 0.34     Doppler Measurements & Calculations  MV E max marc: 97.7 cm/sec  MV A max marc: 55.3 cm/sec  MV E/A: 1.8  LV IVRT: 0.06 sec  MV dec slope: 664.0 cm/sec2  MV dec time: 0.15 sec  Ao V2 max: 156.0 cm/sec  Ao max PG: 10.0 mmHg  LV V1 max P.6 mmHg  LV V1 max: 107.0 cm/sec  PA acc time: 0.10 sec  TR max marc: 320.0 cm/sec  TR max P.0 mmHg  AV Marc Ratio (DI): 0.69  E/E' av.3  Lateral E/e': 8.9  Medial E/e': 9.8     ______________________________________________________________________________  Report approved by: Maricarmen Sainz 2023 03:30 PM               Discharge Medications   Current Discharge Medication List      START taking these medications    Details   ciprofloxacin (CIPRO) 500 MG tablet Take 1 tablet (500 mg) by mouth 2 times daily for 7 days  Qty: 14 tablet, Refills: 0    Associated Diagnoses: E. coli UTI; Septic shock (H); E coli bacteremia         CONTINUE these medications which have CHANGED    Details   hydrochlorothiazide (MICROZIDE) 12.5 MG capsule DO NOT RESTART UNTIL AFTER RECHECK WITH PCP         CONTINUE these medications which have NOT CHANGED    Details   aspirin (ASA) 325 MG tablet Take 325 mg by mouth daily      aspirin-acetaminophen-caffeine (EXCEDRIN EXTRA STRENGTH) 250-250-65 MG tablet Take 2 tablets by mouth every 4 hours as needed      atorvastatin (LIPITOR) 20 MG tablet Take  20 mg by mouth daily      levothyroxine (SYNTHROID/LEVOTHROID) 75 MCG tablet Take 75 mcg by mouth daily      sodium chloride (OCEAN) 0.65 % nasal spray Spray 1 spray in nostril daily as needed.           Allergies   Allergies   Allergen Reactions     Ampicillin [Ampicillin Sodium]      Latex      Penicillin G Hives     Sulfa Drugs

## 2023-02-13 LAB
HAV IGM SERPL QL IA: NONREACTIVE
HBV CORE IGM SERPL QL IA: NONREACTIVE
HBV SURFACE AG SERPL QL IA: NONREACTIVE
HCV AB SERPL QL IA: NONREACTIVE

## 2023-02-14 LAB
BACTERIA BLD CULT: NO GROWTH

## 2023-02-20 ENCOUNTER — OFFICE VISIT (OUTPATIENT)
Dept: INTERNAL MEDICINE | Facility: CLINIC | Age: 68
End: 2023-02-20
Payer: MEDICARE

## 2023-02-20 VITALS
RESPIRATION RATE: 16 BRPM | HEART RATE: 72 BPM | DIASTOLIC BLOOD PRESSURE: 72 MMHG | TEMPERATURE: 98.1 F | BODY MASS INDEX: 28.96 KG/M2 | WEIGHT: 174 LBS | OXYGEN SATURATION: 98 % | SYSTOLIC BLOOD PRESSURE: 116 MMHG

## 2023-02-20 DIAGNOSIS — E88.09 HYPOALBUMINEMIA: ICD-10-CM

## 2023-02-20 DIAGNOSIS — B96.20 E COLI BACTEREMIA: ICD-10-CM

## 2023-02-20 DIAGNOSIS — R78.81 E COLI BACTEREMIA: ICD-10-CM

## 2023-02-20 DIAGNOSIS — N17.9 ACUTE KIDNEY INJURY (H): ICD-10-CM

## 2023-02-20 DIAGNOSIS — R79.89 ABNORMAL LFTS: ICD-10-CM

## 2023-02-20 DIAGNOSIS — G93.40 ENCEPHALOPATHY: ICD-10-CM

## 2023-02-20 DIAGNOSIS — R65.21 SEPTIC SHOCK (H): Primary | ICD-10-CM

## 2023-02-20 DIAGNOSIS — A41.9 SEPTIC SHOCK (H): Primary | ICD-10-CM

## 2023-02-20 DIAGNOSIS — D64.9 ANEMIA, UNSPECIFIED TYPE: ICD-10-CM

## 2023-02-20 PROBLEM — J96.01 ACUTE RESPIRATORY FAILURE WITH HYPOXIA (H): Status: RESOLVED | Noted: 2023-02-10 | Resolved: 2023-02-20

## 2023-02-20 PROBLEM — J81.0 ACUTE PULMONARY EDEMA (H): Status: RESOLVED | Noted: 2023-02-12 | Resolved: 2023-02-20

## 2023-02-20 LAB
ALBUMIN SERPL BCG-MCNC: 3.8 G/DL (ref 3.5–5.2)
ALP SERPL-CCNC: 136 U/L (ref 35–104)
ALT SERPL W P-5'-P-CCNC: 25 U/L (ref 10–35)
ANION GAP SERPL CALCULATED.3IONS-SCNC: 10 MMOL/L (ref 7–15)
AST SERPL W P-5'-P-CCNC: 21 U/L (ref 10–35)
BILIRUB SERPL-MCNC: 0.4 MG/DL
BUN SERPL-MCNC: 16.2 MG/DL (ref 8–23)
CALCIUM SERPL-MCNC: 10.6 MG/DL (ref 8.8–10.2)
CHLORIDE SERPL-SCNC: 104 MMOL/L (ref 98–107)
CREAT SERPL-MCNC: 0.92 MG/DL (ref 0.51–0.95)
CRP SERPL-MCNC: 5.21 MG/L
DEPRECATED HCO3 PLAS-SCNC: 28 MMOL/L (ref 22–29)
ERYTHROCYTE [DISTWIDTH] IN BLOOD BY AUTOMATED COUNT: 14.4 % (ref 10–15)
GFR SERPL CREATININE-BSD FRML MDRD: 67 ML/MIN/1.73M2
GLUCOSE SERPL-MCNC: 97 MG/DL (ref 70–99)
HCT VFR BLD AUTO: 38.3 % (ref 35–47)
HGB BLD-MCNC: 11.9 G/DL (ref 11.7–15.7)
MCH RBC QN AUTO: 30.1 PG (ref 26.5–33)
MCHC RBC AUTO-ENTMCNC: 31.1 G/DL (ref 31.5–36.5)
MCV RBC AUTO: 97 FL (ref 78–100)
PLATELET # BLD AUTO: 921 10E3/UL (ref 150–450)
POTASSIUM SERPL-SCNC: 4 MMOL/L (ref 3.4–5.3)
PROT SERPL-MCNC: 7.6 G/DL (ref 6.4–8.3)
RBC # BLD AUTO: 3.95 10E6/UL (ref 3.8–5.2)
SODIUM SERPL-SCNC: 142 MMOL/L (ref 136–145)
WBC # BLD AUTO: 9.3 10E3/UL (ref 4–11)

## 2023-02-20 PROCEDURE — 86140 C-REACTIVE PROTEIN: CPT | Performed by: INTERNAL MEDICINE

## 2023-02-20 PROCEDURE — 85027 COMPLETE CBC AUTOMATED: CPT | Performed by: INTERNAL MEDICINE

## 2023-02-20 PROCEDURE — 99204 OFFICE O/P NEW MOD 45 MIN: CPT | Performed by: INTERNAL MEDICINE

## 2023-02-20 PROCEDURE — 80053 COMPREHEN METABOLIC PANEL: CPT | Performed by: INTERNAL MEDICINE

## 2023-02-20 PROCEDURE — 36415 COLL VENOUS BLD VENIPUNCTURE: CPT | Performed by: INTERNAL MEDICINE

## 2023-02-20 RX ORDER — ALENDRONATE SODIUM 35 MG/1
35 TABLET ORAL
COMMUNITY

## 2023-02-20 NOTE — PROGRESS NOTES
"  Assessment & Plan   Problem List Items Addressed This Visit     Septic shock (H) - Primary    Relevant Orders    CBC with platelets    Comprehensive metabolic panel (BMP + Alb, Alk Phos, ALT, AST, Total. Bili, TP)    CRP, inflammation    E coli bacteremia    Relevant Orders    CBC with platelets    Comprehensive metabolic panel (BMP + Alb, Alk Phos, ALT, AST, Total. Bili, TP)    CRP, inflammation    Anemia, unspecified type    Acute kidney injury (H)    Encephalopathy    Hypoalbuminemia    Abnormal LFTs      Patient is here for follow-up of a hospitalization for 6 days for septic shock.  She was found to have E. coli bladder infection and bacteremia.  She had some acute renal failure with a creatinine of 1.33 which improved.  She had anemia after lots of fluids which started to come up.  Elevated CRP.  Abnormal LFTs and a low calcium.  Patient is much improved, eating, bite back to bike riding.  Energy is still low.  Her exam is normal  We will check her labs today including comprehensive panel, CBC, CRP.       MED REC REQUIRED  Post Medication Reconciliation Status: BMI:   Estimated body mass index is 28.96 kg/m  as calculated from the following:    Height as of 2/7/23: 1.651 m (5' 5\").    Weight as of this encounter: 78.9 kg (174 lb).           No follow-ups on file.    David Marion MD  [facility] KATLIN Whyte is a 67 year old accompanied by her friend, presenting for the following health issues:  Hospital F/U    [facility]     Hospital Follow-up Visit:    Hospital/Nursing Home/IP Rehab Facility: [facility]  Date of Admission: 2/7/23  Date of Discharge: 2/12/23  Reason(s) for Admission: Septic shock    Was your hospitalization related to COVID-19? No   Problems taking medications regularly:  None  Medication changes since discharge: None  Problems adhering to non-medication therapy:  None    Summary of hospitalization:  [facility] discharge " summary reviewed  Diagnostic Tests/Treatments reviewed.  Follow up needed: none  Other Healthcare Providers Involved in Patient s Care:         None  Update since discharge: improved.         Plan of care communicated with patient and friend           She got sick with flu like symptoms, chills, incoherent and weak and drunk like, friend and family noticed and brought her in to the ER.       so alone.      Normally has good health.  Hypothyroidism, hyperlipidemia on atorvastatin, fosamax for osteopenia.      Now is better, not back to normal, sluggish still. Trying to eat. Back to biking less then before.      Finished ciprofloxacin, stool is better.      bp has been good off hydrochlorothiazide, 110/60-70      Review of Systems         Objective    /72   Pulse 72   Temp 98.1  F (36.7  C) (Temporal)   Resp 16   Wt 78.9 kg (174 lb)   SpO2 98%   BMI 28.96 kg/m    Body mass index is 28.96 kg/m .  Physical Exam   GENERAL: healthy, alert and no distress  NECK: no adenopathy, no asymmetry, masses, or scars and thyroid normal to palpation  RESP: lungs clear to auscultation - no rales, rhonchi or wheezes  CV: regular rate and rhythm, normal S1 S2, no S3 or S4, no murmur, click or rub, no peripheral edema and peripheral pulses strong  ABDOMEN: soft, nontender, no hepatosplenomegaly, no masses and bowel sounds normal  MS: no gross musculoskeletal defects noted, no edema

## 2023-03-03 ENCOUNTER — LAB (OUTPATIENT)
Dept: LAB | Facility: CLINIC | Age: 68
End: 2023-03-03
Payer: MEDICARE

## 2023-03-03 DIAGNOSIS — R65.21 SEPTIC SHOCK (H): ICD-10-CM

## 2023-03-03 DIAGNOSIS — A41.9 SEPTIC SHOCK (H): ICD-10-CM

## 2023-03-03 LAB
CALCIUM SERPL-MCNC: 9.8 MG/DL (ref 8.8–10.2)
PLATELET # BLD AUTO: 338 10E3/UL (ref 150–450)

## 2023-03-03 PROCEDURE — 82310 ASSAY OF CALCIUM: CPT

## 2023-03-03 PROCEDURE — 36415 COLL VENOUS BLD VENIPUNCTURE: CPT

## 2023-03-03 PROCEDURE — 85049 AUTOMATED PLATELET COUNT: CPT
